# Patient Record
Sex: FEMALE | Race: WHITE | URBAN - METROPOLITAN AREA
[De-identification: names, ages, dates, MRNs, and addresses within clinical notes are randomized per-mention and may not be internally consistent; named-entity substitution may affect disease eponyms.]

---

## 2018-07-11 ENCOUNTER — TRANSFERRED RECORDS (OUTPATIENT)
Dept: HEALTH INFORMATION MANAGEMENT | Facility: CLINIC | Age: 38
End: 2018-07-11

## 2018-07-12 ENCOUNTER — TRANSFERRED RECORDS (OUTPATIENT)
Dept: HEALTH INFORMATION MANAGEMENT | Facility: CLINIC | Age: 38
End: 2018-07-12

## 2018-07-18 ENCOUNTER — MEDICAL CORRESPONDENCE (OUTPATIENT)
Dept: HEALTH INFORMATION MANAGEMENT | Facility: CLINIC | Age: 38
End: 2018-07-18

## 2018-07-20 ENCOUNTER — TRANSFERRED RECORDS (OUTPATIENT)
Dept: HEALTH INFORMATION MANAGEMENT | Facility: CLINIC | Age: 38
End: 2018-07-20

## 2018-07-20 ENCOUNTER — TELEPHONE (OUTPATIENT)
Dept: ORTHOPEDICS | Facility: CLINIC | Age: 38
End: 2018-07-20

## 2018-07-20 NOTE — TELEPHONE ENCOUNTER
[facility] Call Center    Phone Message    May a detailed message be left on voicemail: yes    Reason for Call: Other: Dr. Simmons of [facility] requesting a call back from Dr. Barrow or his team.  Dr. 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 To avoid multiple trips, Dr. Simmons would like to know if a same day biopsy can be done. She spoke with her Radiologist and they felt they are 99% sure a biopsy is needed; and should be under an ortho specialist.  She would also need to know if a biopsy is scheduled same day to stop all blood thinners beforehand.  Please call her back as soon as possible at [phone]. Thank you. Provider to provider line was declined.    Action Taken: Message routed to:  [facility] Ortho       7/15/18 -  See Dr. Barrow's note from 7/23/18.       Let message for Dr. Simmons that we had scheduled a consult and bx on the same day for Vee for next week 8/1/18.  Patient also notified.  We will cancel appt. With Dr. Barrow scheduled for tomorrow and re-schedule after we have biopsy results.

## 2018-07-23 ENCOUNTER — TELEPHONE (OUTPATIENT)
Dept: ORTHOPEDICS | Facility: CLINIC | Age: 38
End: 2018-07-23

## 2018-07-23 DIAGNOSIS — M79.89 SOFT TISSUE MASS: Primary | ICD-10-CM

## 2018-07-23 NOTE — TELEPHONE ENCOUNTER
FUTURE VISIT INFORMATION      FUTURE VISIT INFORMATION:    Date: 7/26/18    Time: 1230    Location: ORTHO  REFERRAL INFORMATION:    Referring provider:  DR OLGUIN    Referring providers clinic:  [facility]     Reason for visit/diagnosis  PELVIC MASS      RECORDS STATUS        RECORDS RECEIVED FROM: [facility]   DATE RECEIVED: 7/23/18   NOTES STATUS DETAILS   OFFICE NOTE from referring provider Received 6/20/18*   OFFICE NOTE from other specialist N/A    DISCHARGE SUMMARY from hospital N/A    DISCHARGE REPORT from the ER N/A    OPERATIVE REPORT N/A    MEDICATION LIST Received    IMPLANT RECORD/STICKER N/A    LABS     CBC/DIFF N/A    CULTURES N/A    INJECTIONS DONE IN RADIOLOGY N/A    MRI Received 7/12/18*   CT SCAN Received 7/11/18*   XRAYS (IMAGES & REPORTS) N/A    TUMOR     PATHOLOGY  Slides & report N/A

## 2018-07-23 NOTE — TELEPHONE ENCOUNTER
"I received a call from referring physician Dr. Chetna Simmons about this patient who is 38-year-old woman with newly identified 6 cm mass along the left pelvic sidewall in a retroperitoneal location.  Dr. Jara requests that we see the patient for initial evaluation and consider performing biopsy on the same day to minimize travel inconvenience for the patient.  I reviewed the films and imaging studies.  Patient has a history of pulmonary embolus and is on Xarelto.  I have recommended the followin. Arrange for outpatient consultation in the morning and biopsy by interventional radiology service in the afternoon.  2. Discontinue Xarelto today.  3. I have asked Trinh Perez to contact our to interventional radiology service about seeking their approval for the above plan.  4. The preferred biopsy route has been outlined on the MRI axial fat suppression study on PACS under presentation state = \"biopsy route\".        MD Ruby Osman Family Professor  Oncology and Adult Reconstructive Surgery  Dept Orthopaedic Surgery, [facility]  [phone] office, [phone] pager  [email or web address]    "

## 2018-07-25 ENCOUNTER — HEALTH MAINTENANCE LETTER (OUTPATIENT)
Age: 38
End: 2018-07-25

## 2018-07-26 ENCOUNTER — PRE VISIT (OUTPATIENT)
Dept: ORTHOPEDICS | Facility: CLINIC | Age: 38
End: 2018-07-26

## 2018-07-26 ASSESSMENT — ENCOUNTER SYMPTOMS
DYSPNEA ON EXERTION: 0
POSTURAL DYSPNEA: 0
SPUTUM PRODUCTION: 1
WHEEZING: 0
HEMOPTYSIS: 0
SNORES LOUDLY: 0
COUGH: 1
COUGH DISTURBING SLEEP: 1
SHORTNESS OF BREATH: 1

## 2018-07-30 ENCOUNTER — TELEPHONE (OUTPATIENT)
Dept: INTERVENTIONAL RADIOLOGY/VASCULAR | Facility: CLINIC | Age: 38
End: 2018-07-30

## 2018-08-01 ENCOUNTER — APPOINTMENT (OUTPATIENT)
Dept: INTERVENTIONAL RADIOLOGY/VASCULAR | Facility: CLINIC | Age: 38
End: 2018-08-01
Attending: ORTHOPAEDIC SURGERY
Payer: COMMERCIAL

## 2018-08-01 ENCOUNTER — OFFICE VISIT (OUTPATIENT)
Dept: INTERVENTIONAL RADIOLOGY/VASCULAR | Facility: CLINIC | Age: 38
End: 2018-08-01
Payer: COMMERCIAL

## 2018-08-01 ENCOUNTER — APPOINTMENT (OUTPATIENT)
Dept: MEDSURG UNIT | Facility: CLINIC | Age: 38
End: 2018-08-01
Attending: ORTHOPAEDIC SURGERY
Payer: COMMERCIAL

## 2018-08-01 ENCOUNTER — HOSPITAL ENCOUNTER (OUTPATIENT)
Facility: CLINIC | Age: 38
Discharge: HOME | End: 2018-08-01
Attending: ORTHOPAEDIC SURGERY | Admitting: ORTHOPAEDIC SURGERY
Payer: COMMERCIAL

## 2018-08-01 VITALS
OXYGEN SATURATION: 100 % | WEIGHT: 176.2 LBS | DIASTOLIC BLOOD PRESSURE: 74 MMHG | HEART RATE: 82 BPM | SYSTOLIC BLOOD PRESSURE: 111 MMHG

## 2018-08-01 VITALS
TEMPERATURE: 98.1 F | OXYGEN SATURATION: 99 % | DIASTOLIC BLOOD PRESSURE: 65 MMHG | SYSTOLIC BLOOD PRESSURE: 120 MMHG | HEART RATE: 64 BPM | RESPIRATION RATE: 14 BRPM

## 2018-08-01 DIAGNOSIS — R19.00 RETROPERITONEAL MASS: ICD-10-CM

## 2018-08-01 DIAGNOSIS — M79.89 SOFT TISSUE MASS: ICD-10-CM

## 2018-08-01 DIAGNOSIS — M32.8 OTHER FORMS OF SYSTEMIC LUPUS ERYTHEMATOSUS, UNSPECIFIED ORGAN INVOLVEMENT STATUS (H): ICD-10-CM

## 2018-08-01 DIAGNOSIS — R76.0 LUPUS ANTICOAGULANT POSITIVE: ICD-10-CM

## 2018-08-01 DIAGNOSIS — M32.14 LUPUS NEPHRITIS, ISN/RPS CLASS II (H): ICD-10-CM

## 2018-08-01 PROCEDURE — 88184 FLOWCYTOMETRY/ TC 1 MARKER: CPT | Performed by: ORTHOPAEDIC SURGERY

## 2018-08-01 PROCEDURE — 88364 INSITU HYBRIDIZATION (FISH): CPT | Performed by: ORTHOPAEDIC SURGERY

## 2018-08-01 PROCEDURE — 99152 MOD SED SAME PHYS/QHP 5/>YRS: CPT

## 2018-08-01 PROCEDURE — 40000166 ZZH STATISTIC PP CARE STAGE 1

## 2018-08-01 PROCEDURE — 88342 IMHCHEM/IMCYTCHM 1ST ANTB: CPT | Performed by: ORTHOPAEDIC SURGERY

## 2018-08-01 PROCEDURE — 88365 INSITU HYBRIDIZATION (FISH): CPT | Performed by: ORTHOPAEDIC SURGERY

## 2018-08-01 PROCEDURE — 40001004 ZZHCL STATISTIC FLOW INT 9-15 ABY TC 88188: Performed by: ORTHOPAEDIC SURGERY

## 2018-08-01 PROCEDURE — 99153 MOD SED SAME PHYS/QHP EA: CPT

## 2018-08-01 PROCEDURE — 27210909 ZZH NEEDLE CR5

## 2018-08-01 PROCEDURE — 88333 PATH CONSLTJ SURG CYTO XM 1: CPT | Performed by: ORTHOPAEDIC SURGERY

## 2018-08-01 PROCEDURE — 27210903 ZZH KIT CR5

## 2018-08-01 PROCEDURE — 88185 FLOWCYTOMETRY/TC ADD-ON: CPT | Performed by: ORTHOPAEDIC SURGERY

## 2018-08-01 PROCEDURE — 88305 TISSUE EXAM BY PATHOLOGIST: CPT | Performed by: ORTHOPAEDIC SURGERY

## 2018-08-01 PROCEDURE — 49180 BIOPSY ABDOMINAL MASS: CPT

## 2018-08-01 PROCEDURE — 40000141 ZZH STATISTIC PERIPHERAL IV START W/O US GUIDANCE

## 2018-08-01 PROCEDURE — 25000128 H RX IP 250 OP 636: Performed by: STUDENT IN AN ORGANIZED HEALTH CARE EDUCATION/TRAINING PROGRAM

## 2018-08-01 PROCEDURE — 25000128 H RX IP 250 OP 636: Performed by: PHYSICIAN ASSISTANT

## 2018-08-01 PROCEDURE — 25000125 ZZHC RX 250: Performed by: STUDENT IN AN ORGANIZED HEALTH CARE EDUCATION/TRAINING PROGRAM

## 2018-08-01 PROCEDURE — 88341 IMHCHEM/IMCYTCHM EA ADD ANTB: CPT | Performed by: ORTHOPAEDIC SURGERY

## 2018-08-01 RX ORDER — FLUMAZENIL 0.1 MG/ML
0.2 INJECTION, SOLUTION INTRAVENOUS
Status: DISCONTINUED | OUTPATIENT
Start: 2018-08-01 | End: 2018-08-01 | Stop reason: HOSPADM

## 2018-08-01 RX ORDER — CHLOROQUINE PHOSPHATE 250 MG/1
250 TABLET ORAL DAILY
COMMUNITY
Start: 2018-03-25

## 2018-08-01 RX ORDER — GLUCOSAMINE/D3/BOSWELLIA SERRA 1500MG-400
1 TABLET ORAL 2 TIMES DAILY
COMMUNITY
Start: 2018-03-25

## 2018-08-01 RX ORDER — MULTIVITAMIN WITH IRON
TABLET ORAL
COMMUNITY
Start: 2018-01-04

## 2018-08-01 RX ORDER — LEVONORGESTREL AND ETHINYL ESTRADIOL 0.15-0.03
KIT ORAL
COMMUNITY
Start: 2018-03-25

## 2018-08-01 RX ORDER — FENTANYL CITRATE 50 UG/ML
25-50 INJECTION, SOLUTION INTRAMUSCULAR; INTRAVENOUS EVERY 5 MIN PRN
Status: DISCONTINUED | OUTPATIENT
Start: 2018-08-01 | End: 2018-08-01 | Stop reason: HOSPADM

## 2018-08-01 RX ORDER — LANOLIN ALCOHOL/MO/W.PET/CERES
400 CREAM (GRAM) TOPICAL 2 TIMES DAILY
COMMUNITY
Start: 2018-03-25

## 2018-08-01 RX ORDER — LISINOPRIL 5 MG/1
2.5 TABLET ORAL 2 TIMES DAILY
COMMUNITY
Start: 2018-03-25

## 2018-08-01 RX ORDER — SODIUM CHLORIDE 9 MG/ML
INJECTION, SOLUTION INTRAVENOUS CONTINUOUS
Status: DISCONTINUED | OUTPATIENT
Start: 2018-08-01 | End: 2018-08-01 | Stop reason: HOSPADM

## 2018-08-01 RX ORDER — NALOXONE HYDROCHLORIDE 0.4 MG/ML
.1-.4 INJECTION, SOLUTION INTRAMUSCULAR; INTRAVENOUS; SUBCUTANEOUS
Status: DISCONTINUED | OUTPATIENT
Start: 2018-08-01 | End: 2018-08-01 | Stop reason: HOSPADM

## 2018-08-01 RX ORDER — PREDNISONE 5 MG/1
5 TABLET ORAL DAILY
COMMUNITY
Start: 2018-03-25 | End: 2018-10-26

## 2018-08-01 RX ORDER — LIDOCAINE 40 MG/G
CREAM TOPICAL
Status: DISCONTINUED | OUTPATIENT
Start: 2018-08-01 | End: 2018-08-01 | Stop reason: HOSPADM

## 2018-08-01 RX ADMIN — FENTANYL CITRATE 150 MCG: 50 INJECTION INTRAMUSCULAR; INTRAVENOUS at 14:20

## 2018-08-01 RX ADMIN — SODIUM CHLORIDE: 9 INJECTION, SOLUTION INTRAVENOUS at 11:51

## 2018-08-01 RX ADMIN — LIDOCAINE HYDROCHLORIDE 10 ML: 10 INJECTION, SOLUTION EPIDURAL; INFILTRATION; INTRACAUDAL; PERINEURAL at 14:21

## 2018-08-01 RX ADMIN — MIDAZOLAM 3 MG: 1 INJECTION INTRAMUSCULAR; INTRAVENOUS at 14:21

## 2018-08-01 NOTE — PROCEDURES
Interventional Radiology Brief Post Procedure Note    Procedure: Pelvis mass biosy    Proceduralist: Johnny Sinclair MD    Assistant: IR Fellow Physician, Joseph Ohara MD and None    Time Out: Prior to the start of the procedure and with procedural staff participation, I verbally confirmed the patient s identity using two indicators, relevant allergies, that the procedure was appropriate and matched the consent or emergent situation, and that the correct equipment/implants were available. Immediately prior to starting the procedure I conducted the Time Out with the procedural staff and re-confirmed the patient s name, procedure, and site/side. (The Joint Commission universal protocol was followed.)  Yes        Sedation: IR Nurse Monitored Care   Post Procedure Summary:  Prior to the start of the procedure and with procedural staff participation, I verbally confirmed the patient s identity using two indicators, relevant allergies, that the procedure was appropriate and matched the consent or emergent situation, and that the correct equipment/implants were available. Immediately prior to starting the procedure I conducted the Time Out with the procedural staff and re-confirmed the patient s name, procedure, and site/side. (The Joint Commission universal protocol was followed.)  Yes       Sedatives: Fentanyl and Midazolam (Versed)    Vital signs, airway and pulse oximetry were monitored and remained stable throughout the procedure and sedation was maintained until the procedure was complete.  The patient was monitored by staff until sedation discharge criteria were met.    Patient tolerance: Patient tolerated the procedure well with no immediate complications.    Time of sedation in minutes: 30 Minutes minutes from beginning to end of physician one to one monitoring.          Findings: Pelvis mass biopsy    Estimated Blood Loss: Minimal    Fluoroscopy Time:  minute(s)    SPECIMENS: Core needle biopsy specimens sent  for pathological analysis    Complications: 1. None     Condition: Stable    Plan: Bedrest for 1 hours    Comments: See dictated procedure note for full details.    Frank Ohara MD

## 2018-08-01 NOTE — PROGRESS NOTES
1515 Pt tolerating oral intake. Dc instructions reviewed, copy given to pt. Pt may resume xarelto this evening per Dr. Ohara. Pt tolerated ambulation. PIV dc'd. 1530 Pt dc'd home accompanied by mother and sister.

## 2018-08-01 NOTE — IP AVS SNAPSHOT
MRN:[id number]                      After Visit Summary   8/1/2018    Lilia Correia    MRN: [id number]           Visit Information        Department      8/1/2018 10:46 AM [address] [facility]          Review of your medicines      UNREVIEWED medicines. Ask your doctor about these medicines        Dose / Directions    acetaminophen-codeine 300-30 MG per tablet   Commonly known as:  TYLENOL #3        Dose:  30 tablet   Take 30 tablets by mouth as needed   Refills:  0       Biotin 23000 MCG Tabs        Dose:  1 tablet   Take 1 tablet by mouth 2 times daily   Refills:  0       chloroquine 250 MG tablet   Commonly known as:  ARALEN        Dose:  250 mg   Take 250 mg by mouth daily   Refills:  0       cholecalciferol 5000 units Caps capsule   Commonly known as:  vitamin D3        Dose:  5000 Units   Take 5,000 Units by mouth daily   Refills:  0       CRANBERRY (VACC OXYCOCCUS) PO        Dose:  1 capsule   Take 1 capsule by mouth daily   Refills:  0       folic acid 400 MCG tablet   Commonly known as:  FOLVITE        Dose:  400 mcg   Take 400 mcg by mouth 2 times daily   Refills:  0       IRON (FERROUS SULFATE) PO        Dose:  1 tablet   Take 1 tablet by mouth 2 times daily   Refills:  0       levonorgestrel-ethinyl estradiol 0.15-0.03 MG per tablet   Commonly known as:  SEASONALE        Refills:  0       lisinopril 5 MG tablet   Commonly known as:  PRINIVIL/ZESTRIL        Dose:  2.5 mg   2.5 mg 2 times daily   Refills:  0       magnesium 250 MG tablet        Refills:  0       methotrexate (Anti-Rheumatic) 2.5 MG Tabs        Refills:  0       OMEPRAZOLE PO        Dose:  20 mg   Take 20 mg by mouth every morning   Refills:  0       predniSONE 5 MG tablet   Commonly known as:  DELTASONE        Dose:  5 mg   Take 5 mg by mouth daily   Refills:  0       XARELTO 20 MG Tabs tablet   Generic drug:  rivaroxaban ANTICOAGULANT        Refills:  0                Protect others around you: Learn how to safely use,  store and throw away your medicines at www.disposemymeds.org.         Follow-ups after your visit         Care Instructions        Further instructions from your care team       [facility]    Interventional Radiology  Patient Instructions Following Pelvic Biopsy    AFTER YOU GO HOME  ? If you were given sedation DO NOT drive or operate machinery at home or at work for at least 24 hours  ? DO relax and take it easy for 48 hours, no strenuous activity for 24 hours  ? DO drink plenty of fluids  ? DO resume your regular diet, unless otherwise instructed by your Primary Physician  ? Keep the dressing dry and in place for 24 hours.  ? DO NOT SMOKE FOR AT LEAST 24 HOURS, if you have been given any medications that were to help you relax or sedate you during your procedure  ? DO NOT drink alcoholic beverages the day of your procedure  ? DO NOT do any strenuous exercise or lifting (> 10 lbs) for at least 3 days following your procedure  ? DO NOT take a bath or shower for at least 12 hours following your procedure  ? Remove dressing after shower the next day. Replace with Band aid for 2 days.  Never leave a wet dressing in place.  ? DO NOT make any important or legal decisions for 24 hours following your procedure  ? There should be minimum drainage from the biopsy site    CALL THE PHYSICIAN IF:  ? You start bleeding from the procedure site.  If you do start to bleed from that site,  hold pressure on the site for a minimum of 10 minutes.  Your physician will tell you if you need to return to the hospital  ? You develop nausea or vomiting  ? You have excessive swelling, redness, or tenderness at the site  ? You have drainage that looks like it is infected.  ? You experience severe pain  ? You develop hives or a rash or unexplained itching  ? You develop a temperature of 101 degrees F or greater    ADDITIONAL INSTRUCTIONS: Restart Xarelto this evening    [facility] INTERVENTIONAL RADIOLOGY DEPARTMENT  Procedure  Physician:         Dr. Sinclair/Dr. Ohara  Date of procedure: August 1, 2018  Telephone Numbers: [phone] Monday-Friday 8:00 am to 4:30 pm  [phone] After 4:30 pm Monday-Friday, Weekends & Holidays.   Ask for the Interventional Radiologist on call.  Someone is on call 24 hrs/day  [facility] toll free number: [phone] Monday-Friday 8:00 am to 4:30 pm  [facility] Emergency Dept: [phone]           Additional Information About Your Visit        MyChart Information     [facility] gives you secure access to your electronic health record. If you see a primary care provider, you can also send messages to your care team and make appointments. If you have questions, please call your primary care clinic.  If you do not have a primary care provider, please call [phone] and they will assist you.        Care EveryWhere ID     This is your Care EveryWhere ID. This could be used by other organizations to access your [facility] medical records  [id number]        Your Vitals Were     Blood Pressure Pulse Temperature Respirations Pulse Oximetry       115/70 60 98.1  F (36.7  C) (Oral) 14 98%        Primary Care Provider Office Phone # Fax #    Anika Simmons [phone]      Equal Access to Services     ANDRES CUNNINGHAM : Hadii elizabeth ku hadasho Soomaali, waaxda luqadaha, qaybta kaalmada adeegyada, waxay ana hayflory guillermo . So [facility] [phone].    ATENCIÓN: Si habla español, tiene a medeiros disposición servicios gratuitos de asistencia lingüística. Llame al [phone].    We comply with applicable federal civil rights laws and [place] laws. We do not discriminate on the basis of race, color, national origin, age, disability, sex, sexual orientation, or gender identity.            Thank you!     Thank you for choosing [facility] for your care. Our goal is always to provide you with excellent care. Hearing back from our patients is one way we can continue to improve our services. Please take a few  minutes to complete the written survey that you may receive in the mail after you visit with us. Thank you!             Medication List: This is a list of all your medications and when to take them. Check marks below indicate your daily home schedule. Keep this list as a reference.      Medications           Morning Afternoon Evening Bedtime As Needed    acetaminophen-codeine 300-30 MG per tablet   Commonly known as:  TYLENOL #3   Take 30 tablets by mouth as needed                                Biotin 97897 MCG Tabs   Take 1 tablet by mouth 2 times daily                                chloroquine 250 MG tablet   Commonly known as:  ARALEN   Take 250 mg by mouth daily                                cholecalciferol 5000 units Caps capsule   Commonly known as:  vitamin D3   Take 5,000 Units by mouth daily                                CRANBERRY (VACC OXYCOCCUS) PO   Take 1 capsule by mouth daily                                folic acid 400 MCG tablet   Commonly known as:  FOLVITE   Take 400 mcg by mouth 2 times daily                                IRON (FERROUS SULFATE) PO   Take 1 tablet by mouth 2 times daily                                levonorgestrel-ethinyl estradiol 0.15-0.03 MG per tablet   Commonly known as:  SEASONALE                                lisinopril 5 MG tablet   Commonly known as:  PRINIVIL/ZESTRIL   2.5 mg 2 times daily                                magnesium 250 MG tablet                                methotrexate (Anti-Rheumatic) 2.5 MG Tabs                                OMEPRAZOLE PO   Take 20 mg by mouth every morning                                predniSONE 5 MG tablet   Commonly known as:  DELTASONE   Take 5 mg by mouth daily                                XARELTO 20 MG Tabs tablet   Generic drug:  rivaroxaban ANTICOAGULANT

## 2018-08-01 NOTE — IP AVS SNAPSHOT
[address] [address]                                       After Visit Summary   8/1/2018    Lilia Correia    MRN: [id number]           After Visit Summary Signature Page     I have received my discharge instructions, and my questions have been answered. I have discussed any challenges I see with this plan with the nurse or doctor.    ..........................................................................................................................................  Patient/Patient Representative Signature      ..........................................................................................................................................  Patient Representative Print Name and Relationship to Patient    ..................................................               ................................................  Date                                            Time    ..........................................................................................................................................  Reviewed by Signature/Title    ...................................................              ..............................................  Date                                                            Time

## 2018-08-01 NOTE — PROGRESS NOTES
Patient Name: Lilia Correia  Medical Record Number: [id number]  Today's Date: 8/1/2018    Procedure: Pelvic Mass Biopsy   Proceduralist: Dr. Ohara, TO with Dr. Sinclair    Sedation start time: 1340  Sedation end time: 1415  Sedation medications administered: Fentanyl 150 mcg, Versed 3 mg   Total sedation time: 45 min    Procedure start time: 1350  Puncture time: 1351  Procedure end time: 1420    Report given to: Janet SERVIN RN  : N/A    Other Notes: Pt arrived to IR room CT2 from . Pt denies any questions or concerns regarding procedure. Pt positioned supine and monitored per protocol. Pt tolerated procedure without any noted complications.  Dressing CDI.  5 cores obtained, pathology present and responsible for specimen.  Pt transferred back to .    Bria Daniel RN

## 2018-08-01 NOTE — PROGRESS NOTES
Pt back from IR s/p pelvic mass biopsy.  VSS.  Pt alert and oriented x4.  Pt denies any pain.  LLQ site F/D/I.  Pt's family at the bedside.

## 2018-08-01 NOTE — PROGRESS NOTES
First Name: Lilia   Age: 38 year old   Referring Physician: Dr. Barrow   REASON FOR REFERRAL: Consult for left retroperitoneal mass biopsy    Assessment:  Lilai has a very complex history with her SLE as described in the HPI.  She is on Xarelto due to a DVT in 2014 and + lupus anticoagulant test.  Her family feels that she is reno to still be with them because she was so ill in 2014.  She was being worked up for GERD recently and was incidentally found to have a 6 cm left pelvic sidewall mass in a retroperitoneal location.  Biopsy is requested to evaluate for malignancy.    Plan:  Image guided biopsy of left retroperitoneal mass  She has held the Xarelto since Monday.  INR's are not accurate when you are taking this medicaiton  She does not need a pregnancy test, not currently sexually active  Patient has not eaten today    HPI: This is a patient systemic lupus erythematosus. 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 Positive serologies have included a positive SARAH, U1 RNP antibody, double-stranded DNA antibody, rheumatoid factor, CCP antibody, polyclonal hypergammaglobulinemia and hypocomplementemia. She has a history of left leg DVT in 2014 associated with a positive lupus anticoagulant and she is on chronic anticoagulation. She was diagnosed with lupus in 2003. She was initially treated with hydroxychloroquine, but she did not tolerate it due to hair loss. Subsequently she was diagnosed with biopsy-proven lupus nephritis in 2006. She was started on CellCept in April 2006. She did well until June 2013 when she developed right-sided facial paresthesia and she had an MRI of the brain that showed suspicious lesions in the brainstem and thalamus. 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 Her symptoms and brain lesions resolved after she  stopped the CellCept. 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 In September 2013 she developed nonischemic cardiomyopathy felt to likely be viral, which has since resolved. She also had cytomegalovirus (CMV) pneumonitis. Hydroxychloroquine was resumed in 2013, but it again was not tolerated due to hair loss. We switched to chloroquine in January 2014. She is currently on 250 mg daily of chloroquine. She is on low-dose prednisone 5 mg daily.    The patient was in the ER (emergency room) in August 2017 with joint and muscle pain. She was given a prednisone taper. She contacted Rheumatology again in October with polyarticular joint symptoms and they used another taper. That taper ended up having to be extended for a couple of weeks. She saw Dr Matt in November. Her nephritis was quiescent. Her labs were satisfactory at that time. Shortly after that, however, she required yet another prednisone burst for joint pain and swelling. She talked to rheumatology via the phone at that time regarding potentially starting methotrexate or azathioprine. She declined. She was started on 5 mg daily of prednisone at baseline. Despite the low-dose prednisone, she contacted rheumatology again in January 2018 with polyarticular joint symptoms and she used another prednisone taper. The patient noted in early May she developed another episode of hand swelling across her MCP joints. She gets several of these episodes. They last about 24 hours and then resolve. Otherwise, she feels like she has been doing well in terms of her joint symptoms currently. She is concerned about her frequent flares. She does get low-grade temperature elevations with her flares. She started methotrexate in late May, which is weekly.      She has issues with GERD and was referred to GI.  As part of the work-up she had a CT scan of the abdomen and pelvis.  This showed a 6 cm mass along the left pelvic sidewall in a retroperitoneal location.   She has no pain in her left lower quadrant.  A Dr. Chetna Simmons refered the patient to Dr. Barrow.  Dr. Barrow reviewed the patient's chart and imaging and he has placed an image in the chart for the angle he would prefer IR to take for the biopsy.  IR has reviewed the case and will have to take a slightly different angle to avoid the vessels.  The patient is from 3 hours away and so is doing all of her appointments in one day.    Lilia is highly anxious.  Her sister and her mother, help her remember her history, I can tell this frustrates Lilia, but she tolerates it.  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  She told me that she really enjoys her job.  She is worried though because she read the CT report and the identified very small lymph nodes that could be concerning for metastatic disease.  I explained that they report everything they see and have to state all of the possibilities.  However, the lymph nodes are too small at this time to even biopsy or know what to make of, so she should not worry about them at this time.  She should just focus on the biopsy and getting a diagnosis.    PAST MEDICAL HISTORY:   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     GERD (gastroesophageal reflux disease)      Hypertension      Left leg DVT (H) 2014     Non-ischemic cardiomyopathy (H)     likely viral (EBV vs CMV) now resolved     Pneumocystis jiroveci pneumonia (H) 08/2013     PAST SURGICAL HISTORY:   Past Surgical History:   Procedure Laterality Date     ENDOSCOPY  07/24/2018     US BIOPSY RENAL  2006     FAMILY HISTORY:   Family History   Problem Relation Age of Onset     Depression Mother      Hypertension Father      Ovarian Cancer Maternal Grandmother 75     HEART DISEASE Maternal Grandfather 54      Dementia Paternal Grandmother 80     Other - See Comments Paternal Grandfather 92     old age     SOCIAL HISTORY:   Social History   Substance Use Topics     Smoking status: Current Every Day Smoker     Packs/day: 0.50     Years: 20.00     Types: Cigarettes     Start date: 1/1/1998     Smokeless tobacco: Never Used     Alcohol use No     PROBLEM LIST:   Patient Active Problem List    Diagnosis Date Noted     Retroperitoneal mass 08/01/2018     Priority: Medium     Other forms of systemic lupus erythematosus (H) 08/01/2018     Priority: Medium     Lupus nephritis, ISN/RPS class II (H) 08/01/2018     Priority: Medium     Lupus anticoagulant positive 08/01/2018     Priority: Medium     MEDICATIONS:   Prescription Medications as of 8/1/2018             acetaminophen-codeine (TYLENOL #3) 300-30 MG per tablet Take 30 tablets by mouth as needed    Biotin 80663 MCG TABS Take 1 tablet by mouth daily    chloroquine (ARALEN) 250 MG tablet Take 250 mg by mouth daily    cholecalciferol (VITAMIN D3) 5000 units CAPS capsule Take 5,000 Units by mouth daily    CRANBERRY, VACC OXYCOCCUS, PO     folic acid (FOLVITE) 400 MCG tablet     IRON, FERROUS SULFATE, PO     levonorgestrel-ethinyl estradiol (SEASONALE) 0.15-0.03 MG per tablet     lisinopril (PRINIVIL/ZESTRIL) 5 MG tablet     magnesium 250 MG tablet     methotrexate, Anti-Rheumatic, 2.5 MG TABS     predniSONE (DELTASONE) 5 MG tablet     rivaroxaban ANTICOAGULANT (XARELTO) 20 MG TABS tablet       Facility Administered Medications as of 8/1/2018             HOLD: rivaroxaban (XARELTO) pre-procedure High Bleeding Risk [For liver biopsy, lung biopsy, bone biopsy, pancreas biopsy, kidney biopsy and breast biopsy (Vacuum assisted)] HOLD    lidocaine (LMX4) cream Apply topically every hour as needed for pain (with VAD insertion or accessing implanted port.)    lidocaine 1 % 1 mL 1 mL by Other route every hour as needed (mild pain with VAD insertion or accessing implanted port)    sodium  chloride (PF) 0.9% PF flush 3 mL 3 mLs by Intracatheter route every hour as needed for line flush    sodium chloride (PF) 0.9% PF flush 3 mL 3 mLs by Intracatheter route every 8 hours    sodium chloride 0.9% infusion Inject into the vein continuous        ALLERGIES: Amoxicillin-pot clavulanate; Sulfa drugs; Tape  [adhesive tape]; and Sulfamethoxazole-trimethoprim  VITALS: /74  Pulse 82  Wt 79.9 kg (176 lb 3.2 oz)  SpO2 100%    ROS:  Answers for HPI/ROS submitted by the patient on 7/26/2018   General Symptoms: No  Skin Symptoms: No  HENT Symptoms: No  EYE SYMPTOMS: No  HEART SYMPTOMS: No  LUNG SYMPTOMS: Yes  INTESTINAL SYMPTOMS: No  URINARY SYMPTOMS: No  GYNECOLOGIC SYMPTOMS: No  BREAST SYMPTOMS: No  SKELETAL SYMPTOMS: No  BLOOD SYMPTOMS: No  NERVOUS SYSTEM SYMPTOMS: No  MENTAL HEALTH SYMPTOMS: No  Cough: Yes  Sputum or phlegm: Yes  Coughing up blood: No  Difficulty breating or shortness of breath: Yes  Snoring: No  Wheezing: No  Difficulty breathing on exertion: No  Nighttime Cough: Yes  Difficulty breathing when lying flat: No  PHQ-2 Score: 0    Physical Examination: Vital signs are reviewed and they are stable  Constitutional: Pleasant, middle aged woman, in no acute physical distress, came with her family to the appt  Cardiovascular: negative  Respiratory: negative  Musculoskeletal: extremities normal- no gross deformities noted, gait normal and normal muscle tone  Skin: no suspicious lesions or rashes  Neurologic: negative  Psychiatric: anxious and mentation appears normal.    BMP RESULTS:  No results found for: NA, POTASSIUM, CHLORIDE, CO2, ANIONGAP, GLC, BUN, CR, GFRESTIMATED, GFRESTBLACK, CARLOS     CBC RESULTS:  No results found for: WBC, RBC, HGB, HCT, MCV, MCH, MCHC, RDW, PLT    INR/PTT:  No results found for: INR, PTT    Diagnostic studies: see outside CT and MRi    PROVIDER NOTE:  I explained the procedure to Vee, her mother and her sister.  This included:  Preparing for the procedure, the  actual procedure and recovery.  I explained the risks of the biopsy:  Bleeding, infection, hitting an unintended organ (vessel or nerve)  I explained that usually the results return after two to four business days.    I explained that he/she would be contacted by 's office following this to determine a future plan.  Thank you for involving us in the care of your patient.    30 minutes was spent with Lilia and her family.  25 minutes was spent in counseling.    Mag Lopes MS, APRN, CNS, CRN  Clinical Nurse Specialist  Interventional Radiology  [phone] (voice mail)  [phone] (pager)    CC  Patient Care Team:  Anika Simmons MD as PCP - General (Family Practice)  CHECO THOMAS

## 2018-08-01 NOTE — PROGRESS NOTES
1225 Pt on 2a prepped and ready for biopsy. PIV placed. HCG and INR not needed per Mag Moser and Dr. Sinclair. CBC results in care everywhere. H&P current. Family at bedside. Pt consented.

## 2018-08-01 NOTE — DISCHARGE INSTRUCTIONS
[facility]    Interventional Radiology  Patient Instructions Following Pelvic Biopsy    AFTER YOU GO HOME  ? If you were given sedation DO NOT drive or operate machinery at home or at work for at least 24 hours  ? DO relax and take it easy for 48 hours, no strenuous activity for 24 hours  ? DO drink plenty of fluids  ? DO resume your regular diet, unless otherwise instructed by your Primary Physician  ? Keep the dressing dry and in place for 24 hours.  ? DO NOT SMOKE FOR AT LEAST 24 HOURS, if you have been given any medications that were to help you relax or sedate you during your procedure  ? DO NOT drink alcoholic beverages the day of your procedure  ? DO NOT do any strenuous exercise or lifting (> 10 lbs) for at least 3 days following your procedure  ? DO NOT take a bath or shower for at least 12 hours following your procedure  ? Remove dressing after shower the next day. Replace with Band aid for 2 days.  Never leave a wet dressing in place.  ? DO NOT make any important or legal decisions for 24 hours following your procedure  ? There should be minimum drainage from the biopsy site    CALL THE PHYSICIAN IF:  ? You start bleeding from the procedure site.  If you do start to bleed from that site,  hold pressure on the site for a minimum of 10 minutes.  Your physician will tell you if you need to return to the hospital  ? You develop nausea or vomiting  ? You have excessive swelling, redness, or tenderness at the site  ? You have drainage that looks like it is infected.  ? You experience severe pain  ? You develop hives or a rash or unexplained itching  ? You develop a temperature of 101 degrees F or greater    ADDITIONAL INSTRUCTIONS: Restart Xarelto this evening    [facility] INTERVENTIONAL RADIOLOGY DEPARTMENT  Procedure Physician:         Dr. Sinclair/Dr. Ohara  Date of procedure: August 1, 2018  Telephone Numbers: [phone] Monday-Friday 8:00 am to 4:30 pm  [phone] After 4:30 pm  Monday-Friday, Weekends & Holidays.   Ask for the Interventional Radiologist on call.  Someone is on call 24 hrs/day  [facility] toll free number: [phone] Monday-Friday 8:00 am to 4:30 pm  [facility] Emergency Dept: [phone]

## 2018-08-01 NOTE — PROCEDURES
Interventional Radiology Pre-Procedure Sedation Assessment   Time of Assessment: 12:17 PM    Expected Level: Moderate Sedation    Indication: Sedation is required for the following type of Procedure: Biopsy    Sedation and procedural consent: Risks, benefits and alternatives were discussed with Patient    PO Intake: Appropriately NPO for procedure    ASA Class: Class 2 - MILD SYSTEMIC DISEASE, NO ACUTE PROBLEMS, NO FUNCTIONAL LIMITATIONS.    Mallampati: Grade 1:  Soft palate, uvula, tonsillar pillars, and posterior pharyngeal wall visible    Lungs: Lungs Clear with good breath sounds bilaterally    Heart: Normal heart sounds and rate    History and physical reviewed and no updates needed. I have reviewed the lab findings, diagnostic data, medications, and the plan for sedation. I have determined this patient to be an appropriate candidate for the planned sedation and procedure and have reassessed the patient IMMEDIATELY PRIOR to sedation and procedure.    Jameel Claudio DO

## 2018-08-01 NOTE — MR AVS SNAPSHOT
After Visit Summary   8/1/2018    Lilia Correia    MRN: [id number]           Patient Information     Date Of Birth          1980        Visit Information        Provider Department      8/1/2018 9:00 AM Mag Lopes, PIERRE [facility] Interventional Radiology        Today's Diagnoses     Retroperitoneal mass        Other forms of systemic lupus erythematosus, unspecified organ involvement status (H)        Lupus nephritis, ISN/RPS class II (H)        Lupus anticoagulant positive           Follow-ups after your visit        Your next 10 appointments already scheduled     Aug 01, 2018 12:30 PM CDT   CT ABDOMEN RETROPERITONEAL BIOPSY with UUCT2   [facility] [place], Interventional Radiology ([facility], [facility])    [address] [address] [phone]           Please bring any scans or X-rays taken at other hospitals, if they may be helpful. Also bring a list of your medicines, including vitamins, minerals and over-the-counter drugs.  Tell your doctor in advance:   If you have any allergies.   If you are breastfeeding or there s any chance you are pregnant.   If you are taking Coumadin (or any other blood thinners) 5 days prior to the exam for any special instructions.   If you are diabetic to determine if your insulin needs have to be adjusted for the exam.  The day before your exam:   Drink extra fluids  at least six 8-ounce glasses (unless your doctor tells you to restrict fluids).  The day of your exam:   No eating or drinking for 4 hours before your test. You may take medicine with small sips of water.   Plan for an adult to drive you home and stay with you until morning.   Leave your valuables at home.  If you have any questions, please call the imaging department where you will have your exam.              Who to contact     Please call your clinic at [phone] to:    Ask questions about your health    Make or cancel  appointments    Discuss your medicines    Learn about your test results    Speak to your doctor            Additional Information About Your Visit        [employer] Information     [employer] gives you secure access to your electronic health record. If you see a primary care provider, you can also send messages to your care team and make appointments. If you have questions, please call your primary care clinic.  If you do not have a primary care provider, please call [phone] and they will assist you.      [employer] is an electronic gateway that provides easy, online access to your medical records. With [employer], you can request a clinic appointment, read your test results, renew a prescription or communicate with your care team.     To access your existing account, please contact your [facility] or call [phone] for assistance.        Care EveryWhere ID     This is your Care EveryWhere ID. This could be used by other organizations to access your [place] medical records  [id number]        Your Vitals Were     Pulse Pulse Oximetry                82 100%           Blood Pressure from Last 3 Encounters:   08/01/18 109/70   08/01/18 111/74    Weight from Last 3 Encounters:   08/01/18 79.9 kg (176 lb 3.2 oz)              Today, you had the following     No orders found for display      Information about OPIOIDS     PRESCRIPTION OPIOIDS: WHAT YOU NEED TO KNOW   We gave you an opioid (narcotic) pain medicine. It is important to manage your pain, but opioids are not always the best choice. You should first try all the other options your care team gave you. Take this medicine for as short a time (and as few doses) as possible.     These medicines have risks:    DO NOT drive when on new or higher doses of pain medicine. These medicines can affect your alertness and reaction times, and you could be arrested for driving under the influence (DUI). If you need to use opioids long-term, talk to  your care team about driving.    DO NOT operate heave machinery    DO NOT do any other dangerous activities while taking these medicines.     DO NOT drink any alcohol while taking these medicines.      If the opioid prescribed includes acetaminophen, DO NOT take with any other medicines that contain acetaminophen. Read all labels carefully. Look for the word  acetaminophen  or  Tylenol.  Ask your pharmacist if you have questions or are unsure.    You can get addicted to pain medicines, especially if you have a history of addiction (chemical, alcohol or substance dependence). Talk to your care team about ways to reduce this risk.    Store your pills in a secure place, locked if possible. We will not replace any lost or stolen medicine. If you don t finish your medicine, please throw away (dispose) as directed by your pharmacist. The [place] Pollution Control Agency has more information about safe disposal: https://www.pca.[facility].[place]/living-green/managing-unwanted-medications.     All opioids tend to cause constipation. Drink plenty of water and eat foods that have a lot of fiber, such as fruits, vegetables, prune juice, apple juice and high-fiber cereal. Take a laxative (Miralax, milk of magnesia, Colace, Senna) if you don t move your bowels at least every other day.          Primary Care Provider Office Phone # Fax #    Anika Simmons [phone]       [address] [address]        Equal Access to Services     ANDRES CUNNINGHAM : Hadii aad ku hadasho Soomaali, waaxda luqadaha, qaybta kaalmada adeopalyada, brandon robison. So [facility] [phone].    ATENCIÓN: Si habla español, tiene a medeiros disposición servicios gratuitos de asistencia lingüística. Lldeng al [phone].    We comply with applicable federal civil rights laws and [place] laws. We do not discriminate on the basis of race, color, national origin, age, disability, sex, sexual orientation,  or gender identity.            Thank you!     Thank you for choosing [facility] INTERVENTIONAL RADIOLOGY  for your care. Our goal is always to provide you with excellent care. Hearing back from our patients is one way we can continue to improve our services. Please take a few minutes to complete the written survey that you may receive in the mail after your visit with us. Thank you!             Your Updated Medication List - Protect others around you: Learn how to safely use, store and throw away your medicines at www.disposemymeds.org.          This list is accurate as of 8/1/18 10:46 AM.  Always use your most recent med list.                   Brand Name Dispense Instructions for use Diagnosis    acetaminophen-codeine 300-30 MG per tablet    TYLENOL #3     Take 30 tablets by mouth as needed        Biotin 79550 MCG Tabs      Take 1 tablet by mouth daily        chloroquine 250 MG tablet    ARALEN     Take 250 mg by mouth daily        cholecalciferol 5000 units Caps capsule    vitamin D3     Take 5,000 Units by mouth daily        CRANBERRY (VACC OXYCOCCUS) PO           folic acid 400 MCG tablet    FOLVITE          IRON (FERROUS SULFATE) PO           levonorgestrel-ethinyl estradiol 0.15-0.03 MG per tablet    SEASONALE          lisinopril 5 MG tablet    PRINIVIL/ZESTRIL          magnesium 250 MG tablet           methotrexate (Anti-Rheumatic) 2.5 MG Tabs           predniSONE 5 MG tablet    DELTASONE          XARELTO 20 MG Tabs tablet   Generic drug:  rivaroxaban ANTICOAGULANT

## 2018-08-01 NOTE — LETTER
8/1/2018       RE: Lilia Correia  [address] [facility] [address]     Dear Colleague,    Thank you for referring your patient, Lilia Correia, to the [facility] INTERVENTIONAL RADIOLOGY at [facility]. Please see a copy of my visit note below.    First Name: Lilia   Age: 38 year old   Referring Physician: Dr. Barrow   REASON FOR REFERRAL: Consult for left retroperitoneal mass biopsy    Assessment:  Lilia has a very complex history with her SLE as described in the HPI.  She is on Xarelto due to a DVT in 2014 and + lupus anticoagulant test.  Her family feels that she is reno to still be with them because she was so ill in 2014.  She was being worked up for GERD recently and was incidentally found to have a 6 cm left pelvic sidewall mass in a retroperitoneal location.  Biopsy is requested to evaluate for malignancy.    Plan:  Image guided biopsy of left retroperitoneal mass  She has held the Xarelto since Monday.  INR's are not accurate when you are taking this medicaiton  She does not need a pregnancy test, not currently sexually active  Patient has not eaten today    HPI: This is a patient systemic lupus erythematosus. 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 Positive serologies have included a positive SARAH, U1 RNP antibody, double-stranded DNA antibody, rheumatoid factor, CCP antibody, polyclonal hypergammaglobulinemia and hypocomplementemia. She has a history of left leg DVT in 2014 associated with a positive lupus anticoagulant and she is on chronic anticoagulation. She was diagnosed with lupus in 2003. She was initially treated with hydroxychloroquine, but she did not tolerate it due to hair loss. Subsequently she was diagnosed with biopsy-proven lupus nephritis in 2006. She was started on CellCept in April 2006. She did well until June 2013 when she developed  right-sided facial paresthesia and she had an MRI of the brain that showed suspicious lesions in the brainstem and thalamus. 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 Her symptoms and brain lesions resolved after she stopped the CellCept. 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 In September 2013 she developed nonischemic cardiomyopathy felt to likely be viral, which has since resolved. She also had cytomegalovirus (CMV) pneumonitis. Hydroxychloroquine was resumed in 2013, but it again was not tolerated due to hair loss. We switched to chloroquine in January 2014. She is currently on 250 mg daily of chloroquine. She is on low-dose prednisone 5 mg daily.    The patient was in the ER (emergency room) in August 2017 with joint and muscle pain. She was given a prednisone taper. She contacted Rheumatology again in October with polyarticular joint symptoms and they used another taper. That taper ended up having to be extended for a couple of weeks. She saw Dr Matt in November. Her nephritis was quiescent. Her labs were satisfactory at that time. Shortly after that, however, she required yet another prednisone burst for joint pain and swelling. She talked to rheumatology via the phone at that time regarding potentially starting methotrexate or azathioprine. She declined. She was started on 5 mg daily of prednisone at baseline. Despite the low-dose prednisone, she contacted rheumatology again in January 2018 with polyarticular joint symptoms and she used another prednisone taper. The patient noted in early May she developed another episode of hand swelling across her MCP joints. She gets several of these episodes. They last about 24 hours and then resolve. Otherwise, she feels like she has been doing well in terms of her joint symptoms currently. She is concerned about her frequent flares. She does get low-grade temperature  elevations with her flares. She started methotrexate in late May, which is weekly.      She has issues with GERD and was referred to GI.  As part of the work-up she had a CT scan of the abdomen and pelvis.  This showed a 6 cm mass along the left pelvic sidewall in a retroperitoneal location.  She has no pain in her left lower quadrant.  A Dr. Chetna Simmons  refered the patient to Dr. Barrow.  Dr. Barrow reviewed the patient's chart and imaging and he has placed an image in the chart for the angle he would prefer IR to take for the biopsy.  IR has reviewed the case and will have to take a slightly different angle to avoid the vessels.  The patient is from 3 hours away and so is doing all of her appointments in one day.    Lilia is highly anxious.  Her sister and her mother, help her remember her history, I can tell this frustrates Lilia, but she tolerates it.  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  She told me that she really enjoys her job.  She is worried though because she read the CT report and the identified very small lymph nodes that could be concerning for metastatic disease.  I explained that they report everything they see and have to state all of the possibilities.  However, the lymph nodes are too small at this time to even biopsy or know what to make of, so she should not worry about them at this time.  She should just focus on the biopsy and getting a diagnosis.    PAST MEDICAL HISTORY:   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     GERD (gastroesophageal reflux disease)      Hypertension      Left leg DVT (H) 2014     Non-ischemic cardiomyopathy (H)     likely viral (EBV vs CMV) now resolved     Pneumocystis jiroveci pneumonia (H) 08/2013     PAST SURGICAL HISTORY:   Past Surgical  History:   Procedure Laterality Date     ENDOSCOPY  07/24/2018     US BIOPSY RENAL  2006     FAMILY HISTORY:   Family History   Problem Relation Age of Onset     Depression Mother      Hypertension Father      Ovarian Cancer Maternal Grandmother 75     HEART DISEASE Maternal Grandfather 54     Dementia Paternal Grandmother 80     Other - See Comments Paternal Grandfather 92     old age     SOCIAL HISTORY:   Social History   Substance Use Topics     Smoking status: Current Every Day Smoker     Packs/day: 0.50     Years: 20.00     Types: Cigarettes     Start date: 1/1/1998     Smokeless tobacco: Never Used     Alcohol use No     PROBLEM LIST:   Patient Active Problem List    Diagnosis Date Noted     Retroperitoneal mass 08/01/2018     Priority: Medium     Other forms of systemic lupus erythematosus (H) 08/01/2018     Priority: Medium     Lupus nephritis, ISN/RPS class II (H) 08/01/2018     Priority: Medium     Lupus anticoagulant positive 08/01/2018     Priority: Medium     MEDICATIONS:   Prescription Medications as of 8/1/2018             acetaminophen-codeine (TYLENOL #3) 300-30 MG per tablet Take 30 tablets by mouth as needed    Biotin 73917 MCG TABS Take 1 tablet by mouth daily    chloroquine (ARALEN) 250 MG tablet Take 250 mg by mouth daily    cholecalciferol (VITAMIN D3) 5000 units CAPS capsule Take 5,000 Units by mouth daily    CRANBERRY, VACC OXYCOCCUS, PO     folic acid (FOLVITE) 400 MCG tablet     IRON, FERROUS SULFATE, PO     levonorgestrel-ethinyl estradiol (SEASONALE) 0.15-0.03 MG per tablet     lisinopril (PRINIVIL/ZESTRIL) 5 MG tablet     magnesium 250 MG tablet     methotrexate, Anti-Rheumatic, 2.5 MG TABS     predniSONE (DELTASONE) 5 MG tablet     rivaroxaban ANTICOAGULANT (XARELTO) 20 MG TABS tablet       Facility Administered Medications as of 8/1/2018             HOLD: rivaroxaban (XARELTO) pre-procedure High Bleeding Risk [For liver biopsy, lung biopsy, bone biopsy, pancreas biopsy, kidney biopsy  and breast biopsy (Vacuum assisted)] HOLD    lidocaine (LMX4) cream Apply topically every hour as needed for pain (with VAD insertion or accessing implanted port.)    lidocaine 1 % 1 mL 1 mL by Other route every hour as needed (mild pain with VAD insertion or accessing implanted port)    sodium chloride (PF) 0.9% PF flush 3 mL 3 mLs by Intracatheter route every hour as needed for line flush    sodium chloride (PF) 0.9% PF flush 3 mL 3 mLs by Intracatheter route every 8 hours    sodium chloride 0.9% infusion Inject into the vein continuous        ALLERGIES: Amoxicillin-pot clavulanate; Sulfa drugs; Tape  [adhesive tape]; and Sulfamethoxazole-trimethoprim  VITALS: /74  Pulse 82  Wt 79.9 kg (176 lb 3.2 oz)  SpO2 100%    ROS:    Physical Examination: Vital signs are reviewed and they are stable  Constitutional: Pleasant, middle aged woman, in no acute physical distress, came with her family to the appt  Cardiovascular: negative  Respiratory: negative  Musculoskeletal: extremities normal- no gross deformities noted, gait normal and normal muscle tone  Skin: no suspicious lesions or rashes  Neurologic: negative  Psychiatric: anxious and mentation appears normal.    BMP RESULTS:  No results found for: NA, POTASSIUM, CHLORIDE, CO2, ANIONGAP, GLC, BUN, CR, GFRESTIMATED, GFRESTBLACK, CARLOS     CBC RESULTS:  No results found for: WBC, RBC, HGB, HCT, MCV, MCH, MCHC, RDW, PLT    INR/PTT:  No results found for: INR, PTT    Diagnostic studies: see outside CT and MRi    PROVIDER NOTE:  I explained the procedure to Vee, her mother and her sister.  This included:  Preparing for the procedure, the actual procedure and recovery.  I explained the risks of the biopsy:  Bleeding, infection, hitting an unintended organ (vessel or nerve)  I explained that usually the results return after two to four business days.    I explained that he/she would be contacted by 's office following this to determine a future plan.  Thank  you for involving us in the care of your patient.    30 minutes was spent with Lilia and her family.  25 minutes was spent in counseling.    Mag Lopes MS, APRN, CNS, CRN  Clinical Nurse Specialist  Interventional Radiology  [phone] (voice mail)  [phone] (pager)    CC  Patient Care Team:  Anika Simmons MD as PCP - General (Family Practice)  CHECO THOMAS

## 2018-08-02 LAB — COPATH REPORT: NORMAL

## 2018-08-08 ENCOUNTER — TELEPHONE (OUTPATIENT)
Dept: ORTHOPEDICS | Facility: CLINIC | Age: 38
End: 2018-08-08

## 2018-08-08 NOTE — TELEPHONE ENCOUNTER
Talked to Lilia about the results of her recent biopsy.  Path not final yet.  I told her we would discuss her case in our tumor conference tomorrow morning, and I would be able to call her back about the final pathology and plan.

## 2018-08-09 ENCOUNTER — TELEPHONE (OUTPATIENT)
Dept: ORTHOPEDICS | Facility: CLINIC | Age: 38
End: 2018-08-09

## 2018-08-09 NOTE — TELEPHONE ENCOUNTER
This patient had images forwarded to me for review and I recommended that a core needle biopsy be performed by our interventional radiology team.  This was completed and the limited sampling received does not show evidence of a neoplasm or lymphoma.  I spoke with Dr. Dejon Delcid of our hematopathology section and he explained that an atypical lymphoplasmacytic infiltrate is visualized that may or may not be driven by Analy-Barr virus.  However, it would be somewhat unusual in the setting of a large discrete 5 cm mass.  Therefore, while lymphoma is not identified on the core needle biopsy sample, it cannot be ruled out with this limited sample.    Consultation with Dr. Wisam Smiley was advised in regards to management of this retroperitoneal pelvic mass and whether or not there is a need for a larger tissue sample to rule out lymphoma or if other markers, additional immunosuppression and/or observation is appropriate.   If a larger tissue sample for pathology is requested, this would involve an open surgical procedure through an iliac crest incision with subperiosteal dissection along the iliac bone to access the mass itself.      I will communicate this information to Dr. Chetna Jara in  [facility] and arrange for consultation with Dr. Smiley.    MD Ruby Osman Family Professor  Oncology and Adult Reconstructive Surgery  Dept Orthopaedic Surgery, [facility]  [phone] office, [phone] pager  [email or web address]

## 2018-08-22 ENCOUNTER — ONCOLOGY VISIT (OUTPATIENT)
Dept: ONCOLOGY | Facility: CLINIC | Age: 38
End: 2018-08-22
Attending: INTERNAL MEDICINE
Payer: COMMERCIAL

## 2018-08-22 ENCOUNTER — RADIANT APPOINTMENT (OUTPATIENT)
Dept: CT IMAGING | Facility: CLINIC | Age: 38
End: 2018-08-22
Payer: COMMERCIAL

## 2018-08-22 VITALS
TEMPERATURE: 97.9 F | SYSTOLIC BLOOD PRESSURE: 96 MMHG | RESPIRATION RATE: 18 BRPM | WEIGHT: 176.7 LBS | BODY MASS INDEX: 23.93 KG/M2 | DIASTOLIC BLOOD PRESSURE: 68 MMHG | HEIGHT: 72 IN | OXYGEN SATURATION: 99 % | HEART RATE: 89 BPM

## 2018-08-22 DIAGNOSIS — M79.89 SOFT TISSUE MASS: ICD-10-CM

## 2018-08-22 DIAGNOSIS — M79.89 SOFT TISSUE MASS: Primary | ICD-10-CM

## 2018-08-22 DIAGNOSIS — D47.9 LYMPHOPROLIFERATIVE DISORDER (H): ICD-10-CM

## 2018-08-22 LAB
ALBUMIN SERPL-MCNC: 3.1 G/DL (ref 3.4–5)
ALP SERPL-CCNC: 61 U/L (ref 40–150)
ALT SERPL W P-5'-P-CCNC: 17 U/L (ref 0–50)
ANION GAP SERPL CALCULATED.3IONS-SCNC: 7 MMOL/L (ref 3–14)
AST SERPL W P-5'-P-CCNC: 19 U/L (ref 0–45)
BASOPHILS # BLD AUTO: 0 10E9/L (ref 0–0.2)
BASOPHILS NFR BLD AUTO: 0 %
BILIRUB SERPL-MCNC: 0.4 MG/DL (ref 0.2–1.3)
BUN SERPL-MCNC: 20 MG/DL (ref 7–30)
CALCIUM SERPL-MCNC: 7.8 MG/DL (ref 8.5–10.1)
CANCER AG125 SERPL-ACNC: <5 U/ML (ref 0–30)
CHLORIDE SERPL-SCNC: 108 MMOL/L (ref 94–109)
CO2 SERPL-SCNC: 22 MMOL/L (ref 20–32)
CREAT SERPL-MCNC: 0.92 MG/DL (ref 0.52–1.04)
CRP SERPL-MCNC: 11.8 MG/L (ref 0–8)
DIFFERENTIAL METHOD BLD: ABNORMAL
EOSINOPHIL # BLD AUTO: 0 10E9/L (ref 0–0.7)
EOSINOPHIL NFR BLD AUTO: 0 %
ERYTHROCYTE [DISTWIDTH] IN BLOOD BY AUTOMATED COUNT: 14.4 % (ref 10–15)
ERYTHROCYTE [SEDIMENTATION RATE] IN BLOOD BY WESTERGREN METHOD: 59 MM/H (ref 0–20)
GFR SERPL CREATININE-BSD FRML MDRD: 68 ML/MIN/1.7M2
GLUCOSE SERPL-MCNC: 86 MG/DL (ref 70–99)
HBV CORE AB SERPL QL IA: NONREACTIVE
HBV SURFACE AB SERPL IA-ACNC: 1.04 M[IU]/ML
HBV SURFACE AG SERPL QL IA: NONREACTIVE
HCT VFR BLD AUTO: 29.4 % (ref 35–47)
HCV AB SERPL QL IA: NONREACTIVE
HGB BLD-MCNC: 9.7 G/DL (ref 11.7–15.7)
IGA SERPL-MCNC: 66 MG/DL (ref 70–380)
IGG SERPL-MCNC: 1630 MG/DL (ref 695–1620)
IGM SERPL-MCNC: 68 MG/DL (ref 60–265)
IMM GRANULOCYTES # BLD: 0 10E9/L (ref 0–0.4)
IMM GRANULOCYTES NFR BLD: 0.8 %
KAPPA LC UR-MCNC: 7.19 MG/DL (ref 0.33–1.94)
KAPPA LC/LAMBDA SER: 3.74 {RATIO} (ref 0.26–1.65)
LAMBDA LC SERPL-MCNC: 1.92 MG/DL (ref 0.57–2.63)
LDH SERPL L TO P-CCNC: 170 U/L (ref 81–234)
LYMPHOCYTES # BLD AUTO: 0.3 10E9/L (ref 0.8–5.3)
LYMPHOCYTES NFR BLD AUTO: 7.5 %
MCH RBC QN AUTO: 33.1 PG (ref 26.5–33)
MCHC RBC AUTO-ENTMCNC: 33 G/DL (ref 31.5–36.5)
MCV RBC AUTO: 100 FL (ref 78–100)
MONOCYTES # BLD AUTO: 0.1 10E9/L (ref 0–1.3)
MONOCYTES NFR BLD AUTO: 3.6 %
NEUTROPHILS # BLD AUTO: 3.2 10E9/L (ref 1.6–8.3)
NEUTROPHILS NFR BLD AUTO: 88.1 %
NRBC # BLD AUTO: 0 10*3/UL
NRBC BLD AUTO-RTO: 0 /100
PLATELET # BLD AUTO: 118 10E9/L (ref 150–450)
POTASSIUM SERPL-SCNC: 3.9 MMOL/L (ref 3.4–5.3)
PROT SERPL-MCNC: 6.9 G/DL (ref 6.8–8.8)
RADIOLOGIST FLAGS: NORMAL
RBC # BLD AUTO: 2.93 10E12/L (ref 3.8–5.2)
SODIUM SERPL-SCNC: 137 MMOL/L (ref 133–144)
URATE SERPL-MCNC: 5.1 MG/DL (ref 2.6–6)
WBC # BLD AUTO: 3.6 10E9/L (ref 4–11)

## 2018-08-22 PROCEDURE — 83615 LACTATE (LD) (LDH) ENZYME: CPT | Performed by: INTERNAL MEDICINE

## 2018-08-22 PROCEDURE — 87799 DETECT AGENT NOS DNA QUANT: CPT | Performed by: INTERNAL MEDICINE

## 2018-08-22 PROCEDURE — 82784 ASSAY IGA/IGD/IGG/IGM EACH: CPT | Performed by: INTERNAL MEDICINE

## 2018-08-22 PROCEDURE — 86304 IMMUNOASSAY TUMOR CA 125: CPT | Performed by: INTERNAL MEDICINE

## 2018-08-22 PROCEDURE — 00000402 ZZHCL STATISTIC TOTAL PROTEIN: Performed by: INTERNAL MEDICINE

## 2018-08-22 PROCEDURE — 80053 COMPREHEN METABOLIC PANEL: CPT | Performed by: INTERNAL MEDICINE

## 2018-08-22 PROCEDURE — 85025 COMPLETE CBC W/AUTO DIFF WBC: CPT | Performed by: INTERNAL MEDICINE

## 2018-08-22 PROCEDURE — 83883 ASSAY NEPHELOMETRY NOT SPEC: CPT | Performed by: INTERNAL MEDICINE

## 2018-08-22 PROCEDURE — 85652 RBC SED RATE AUTOMATED: CPT | Performed by: INTERNAL MEDICINE

## 2018-08-22 PROCEDURE — 84550 ASSAY OF BLOOD/URIC ACID: CPT | Performed by: INTERNAL MEDICINE

## 2018-08-22 PROCEDURE — 86704 HEP B CORE ANTIBODY TOTAL: CPT | Performed by: INTERNAL MEDICINE

## 2018-08-22 PROCEDURE — 87340 HEPATITIS B SURFACE AG IA: CPT | Performed by: INTERNAL MEDICINE

## 2018-08-22 PROCEDURE — 36415 COLL VENOUS BLD VENIPUNCTURE: CPT | Performed by: INTERNAL MEDICINE

## 2018-08-22 PROCEDURE — 99205 OFFICE O/P NEW HI 60 MIN: CPT | Mod: GC | Performed by: INTERNAL MEDICINE

## 2018-08-22 PROCEDURE — 86140 C-REACTIVE PROTEIN: CPT | Performed by: INTERNAL MEDICINE

## 2018-08-22 PROCEDURE — 84165 PROTEIN E-PHORESIS SERUM: CPT | Performed by: INTERNAL MEDICINE

## 2018-08-22 PROCEDURE — 86803 HEPATITIS C AB TEST: CPT | Performed by: INTERNAL MEDICINE

## 2018-08-22 PROCEDURE — G0463 HOSPITAL OUTPT CLINIC VISIT: HCPCS | Mod: ZF

## 2018-08-22 PROCEDURE — 86706 HEP B SURFACE ANTIBODY: CPT | Performed by: INTERNAL MEDICINE

## 2018-08-22 RX ORDER — IOPAMIDOL 755 MG/ML
86 INJECTION, SOLUTION INTRAVASCULAR ONCE
Status: COMPLETED | OUTPATIENT
Start: 2018-08-22 | End: 2018-08-22

## 2018-08-22 RX ADMIN — IOPAMIDOL 86 ML: 755 INJECTION, SOLUTION INTRAVASCULAR at 10:03

## 2018-08-22 ASSESSMENT — PAIN SCALES - GENERAL: PAINLEVEL: NO PAIN (0)

## 2018-08-22 NOTE — LETTER
"8/22/2018      RE: Lilia Correia  [address] [facility] [id number]       [facility] New Patient Visit    Date of Service: 08/22/18        Oncologic diagnosis:     Soft tissue mass in the setting of lupus, referred by Dr. Andrey Barrow.         HPI and ROS:     HPI: Lilia Correia is a 38 year old female with a PMH of systemic lupus, for which she has received immunosuppressive treatment. She also has a history of an immunosuppression-associated lymphoproliferative disorder as well as past CMV and PJP pneumonitis. The soft tissue mass was an incidental finding on a recent CT scan obtained in 07/2018. The patient is accompanied today by her sister and mother.    Regarding her history of the \"lymphoproliferative disorder\", she was on immunosuppression for her systemic lupus when in June 2013 she noticed facial numbness.   She saw a physician in [place] and imaging was obtained which showed \"spots on her brain\" per patient's sister.  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  She was quite ill.  She was taking CellCept at the time and she was referred to [facility] in the fall of 2013 and the CellCept was stopped and at this spots apparently went away and symptoms resolved without recurrence.      She is currently followed at [facility] by Dr. Jaquan Mckeon for a long-standing history of lupus (2003), which remains active. Complications include nephritis (2006), cytopenias, alopecia, polyarticular arthritis, rash, lupus anticoagulant and DVT. 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 Currently, she is on chloroquine(01/2014) with low dose methotrexate (03/2018) and low dose prednisone with bursts of higher dose steroids for exacerbations of arthritis. The methotrexate was added after having more " problems with her lupus in January - February. No recent changes in her systemic lupus.    Ms. Correia was having heartburn and occasional nausea and feeling like throat closing at times after eating for a number of years with an unclear cause. This led to evaluation with a CT scan in 7/2018 which was notable for an indeterminate, partially calcified mass involving the left pelvic sidewall measuring up to 6 cm suspicious for a primary neoplasm. She underwent a CT-guided biopsy as recommended by Dr. Barrow and this was notable for an atypical lymphoplasmacytic infiltrate with no diagnostic evidence of lymphoma.  She was referred to our clinic for further evaluation of the atypical lymphoplasmacytic infiltrate in the setting of a soft tissue mass. She also underwent an upper endoscopy (07/19/2018) - biopsies showed hemosiderin in villous tips of duodenum with no evidence of celiac or Whipple's disease, parasites or viral inclusions. The stomach biopsy showed antral type mucosa with mild nonspecific reactive-type changes. No histopathologic evidence of H pylori.    ROS: Polyarthralgias. Denies any lumps or bumps, fevers, chills, night sweats, recent weight loss, residual facial numbness or any neurologic symptoms after her episode of a lymphoproliferative disorder in 2013  No bruising or bleeding. She is on Xarelto for approximately 5 years, chest pain, shortness of breath, cough, hemoptysis, rash, recent infections, change in menstrual cycles.     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     Social History Main Topics     Smoking status: Current Every Day Smoker     Packs/day: 0.50     Years: 20.00     Types: Cigarettes     Start date: 1/1/1998     Smokeless tobacco: Never Used     Alcohol use No     Drug use: No     Sexual activity: No     Other Topics Concern     Not on file     Social History Narrative     Lives in [place]. No children. Works in finance. Tobacco: 1/2 pack per day. Smoking since high school.  Alcohol: rarely. Drugs: none         Family History:     Family History   Problem Relation Age of Onset     Depression Mother      Hypertension Father      Ovarian Cancer Maternal Grandmother 75     HEART DISEASE Maternal Grandfather 54     Dementia Paternal Grandmother 80     Other - See Comments Paternal Grandfather 92     old age      1st-cousin on father's side, diagnosed with lymphoma in late 30s. No other known cancer history in the family         Allergies:     Allergies   Allergen Reactions     Amoxicillin-Pot Clavulanate Other (See Comments)     Lip and tongue swelling     Sulfamethoxazole-Trimethoprim Rash     Worsening cytopenia, kidney injury, rash 9/2013.   Worsening cytopenia, kidney injury, rash 9/2013.      Tape  [Adhesive Tape] Itching     Sulfa Drugs Unknown and Other (See Comments)     Kidney failure             Medications:     Cholecalciferol, Vitamin D3, 1,000 unit oral Tablet, Chewable   Chew and Swallow 5,000 Units by mouth Daily.   0     Active   acetaminophen (AKA: TYLENOL) 500 mg oral Tablet   Take 1-2 Tabs by mouth every 8 hours as needed for  pain. For leg cramps   0 10/01/2013   Active   magnesium 250 mg oral Tablet   Take 250 mg by mouth once daily.   0     Active   CRANBERRY EXTRACT (CRANBERRY ORAL)   Take 1 Tab by mouth Daily.   0     Active   FERROUS FUMARATE (IRON ORAL)   Take 2 Tabs by mouth Daily.   0     Active   lisinopril (AKA: PRINIVIL/ZESTRIL) 5 mg oral Tablet   TAKE ONE TABLET BY MOUTH ONCE DAILY 90 tablet   3 10/26/2016   Active   levonorgestrel-ethinyl estradiol (AKA: SEASONALE CONTRACEPTIVE) 0.15 mg-30 mcg oral tablet   Take 1 tablet by mouth daily.   0     Active   folic acid 400 mcg oral Tablet   Take 800 mcg by mouth.   0     Active   predniSONE (AKA: DELTASONE) 5 mg oral Tablet    Indications: Systemic lupus erythematosus, unspecified SLE type, unspecified organ involvement status (HCC) Take 1 tablet (5 mg total) by mouth once daily.   0 03/19/2018   Active   acetaminophen-codeine (AKA: TYLENOL #3) 300-30 mg oral Tablet    Indications: Systemic lupus erythematosus, unspecified SLE type, unspecified organ involvement status (HCC) Take 1-2 tablets by mouth every 6 hours as needed for severe pain. 30 tablet   0 03/22/2018   Active   chloroquine (AKA: ARALEN) 250 mg oral Tablet    Indications: Systemic lupus erythematosus, unspecified SLE type, unspecified organ involvement status (HCC) Take 1 tablet (250 mg total) by mouth once daily. 200 tablet   1 05/24/2018   Active   methotrexate 2.5 mg oral Tablet    Indications: Systemic lupus erythematosus, unspecified SLE type, unspecified organ involvement status (HCC) Take 6 tablets (15 mg total) by mouth once weekly.   0 06/18/2018   Active   Biotin 10,000 mcg oral Capsule   Take 20,000 mcg by mouth once daily.   0     Active   rivaroxaban (XARELTO) 20 mg oral Tablet    Indications: History of DVT (deep vein thrombosis) Take 1 tablet (20 mg total) by mouth once daily. Begin 10/19/13, for right leg DVT (blood clot). Refills to Dr. Rodrigo Jaime, [facility]. 30 tablet   0 07/21/2018    "Active   omeprazole magnesium (AKA: PRILOSEC OTC) 20 mg oral Tablet, Delayed Release (E.C.)    Indications: Vomiting, intractability of vomiting not specified, presence of nausea not specified, unspecified vomiting type Take 1 tablet (20 mg total) by mouth once daily. 90 tablet   3 07/20/2018 07/20/2019 Active   methotrexate 2.5 mg oral Tablet    Indications: Systemic lupus erythematosus, unspecified SLE type, unspecified organ involvement status (HCC)           No recent changes         Exam:   BP 96/68 (BP Location: Left arm, Patient Position: Sitting, Cuff Size: Adult Regular)  Pulse 89  Temp 97.9  F (36.6  C) (Tympanic)  Resp 18  Ht 1.83 m (6' 0.05\")  Wt 80.2 kg (176 lb 11.2 oz)  LMP 08/02/2018  SpO2 99%  BMI 23.93 kg/m2  General: NAD, appears calm, interactive but quiet, sister often answering questions.  HEENT: Anicteric, no conjunctival injection, PERRL, EOM full, no oropharyngeal masses, mucosa - moist without evident lesions.  NECK: no cervical/supraclavicular nodes. Thyroid without mass.  AXILLAE: no nodes.  CHEST: clear to ausculation/percussion, normal vesicular breath sounds   HEART: RRR, normal S1 and S2, no m/r/g  ABDOMEN: Soft, non-tender, non-distended. Normoactive bowel sounds. No organomegaly or mass. No inguinal/femoral nodes.  SKIN: no rashes on areas of exposed skin. No petechiae or ecchymoses noted.    EXTREMITIES: no edema, no epitrochlear nodes.     NEUROLOGICAL: alert, conversing appropriately. Cranial nerves intact. Gait normal. Grossly w/o focal deficits          Labs:     (08/22/2018)    Hemoglobin 9.7 gm%, , WBC 3600 (88% P, 8% L) () and 118,000 platelets  Electrolytes - normal, Ca 7.8 (albumin 3.1), uric acid 5.1  Liver enzymes, including LDH - normal  ESR 59, CRP 11.8   - <5  EBV DNA 4435, log 3.6  Hepatitis B/C serologies - nonreactive  SPEP - albumin 3.2, gamma fraction 1.4, small monoclonal peak (0.2)  IgA 66 (), Ig G 1630 (695-1620), IgM 68 " ()  Echo free light chain 7.19* (0.33-1.94), lambda free light chain (1.92 (0.57-2.63), kappa:lambda ratio 3.74* (0.)    Pathology  SPECIMEN:  (08/01/2018)  Left retroperitoneal mass biopsy, CT guided    FINAL DIAGNOSIS:   Retroperitoneal mass, left, biopsy:   -- Atypical lymphoplasmacytic infiltrate, non diagnostic for lymphoma or lymphoproliferative disorder     COMMENT: Concurrent flow cytometry reports ([id number]) polytypic B cells and no aberrant immunophenotype on T cells. Immunostains exclude IgG4 sclerosing disease.     If there is strong clinical concern for a hematolymphoid or other non-hematopoietic neoplasm (with adjacent lymphoplasmacytic infiltrate), a rebiopsy may be indicated.     Flow cytometry  INTERPRETATION:   -- Polytypic B cells   -- No aberrant immunophenotype on T cells     COMMENT: There is no immunophenotypic evidence of non-Hodgkin lymphoma.    CT abdomen from 7/11/2018 (outside)      CT chest 8/22/2018  IMPRESSION:   1. Scattered pulmonary nodules, largest is an indeterminate 7 mm spiculated nodule in the right upper lobe. Recommend follow-up chest CT in 3-6 months.  2. Moderate centrilobular emphysema.         Assessment and Plan:     This is a 38 year old female with a PMH of systemic lupus, hypertension, history of immunosuppression associated lymphoproliferative disorder that was significantly associated, CMV pneumonitis presents to clinic for evaluation of a soft tissue mass.    #  Soft tissue mass  #  Atypical lymphoplasmacytic infiltrate  #  History of a lymphoproliferative disorder, EBV positive    The mass identified on CT is asymptomatic and the biopsy does not show evidence of lymphoma microscopically or by immunologic studies.  We discussed that an atypical lymphoplasmacytic infiltrate can be found in inflammation and is not a specific finding either in a patient on immunosuppression or with lupus. She has a history of a lymphoproliferative disorder which was  reportedly EBV associated and this could potentially be reactivating so we checked circulating EBV in the blood, which was present only at a low lever.This is not surprising in an immunocompromised host and not suggestive of an etiology.  We discussed that the differential for her soft tissue masses is very broad, including both benign and malignant conditions, and, unfortunately the biopsy does not allow confirmation of either a benign or malignant process.  We discussed we will likely need a further biopsy to establish investigate the nature of this mass.      Because the physical examination did not disclose adenopathy or other sites to consider biopsying, we obtained a chest CT after the visit to look for other areas that might,. The CT does not show abnormalities that would likely be helpful to biopsy, but does have an abnormality that either should be compared to any previous chest CT scans or the scan, itself, should be repeated in 3-6 months. We discussed that this could have been there for a while given that it has some calcification. The outside CT reports states the recent 07/2018 abdominal-pelvic CT was compared to a previous CT from 09/2013. However, the report does not mention whether the mass was present earlier (presumably not, but this should be clarified). (ADDENDUM - Dr. Barrow was able to review and this was a chest CT.)    The laboratory studies document moderate anemia and cytopenias, inflammation, low level of circulating EBV, a very small monoclonal protein and a high kappa:lambda ratio. None of these is diagnostic of a malignancy - all being consistent with an inflammatory state, such as lupus or an immunosuppressed state. These results could be compared with past studies in this patient, if available.    The differential diagnosis is broad and includes sarcoma, granuloma, teratoma, hematoma, lymphoma, etc. The  is less than 5.     Recommendations  - after the results are back, plan to  communicate with Dr. Barrow. Suspect that we will need another biopsy to establishe diagnosis  - seek additional information from previous outside CT's (2013) regarding pulmonary nodule and pelvic mass  - communicate the results with the patient    #  Pancytopenia:  Recent labs outside facility did not show pancytopenia. Hemoglobin was 11.6 on 07/18/2018 and in that range for much of the past year. She does have elevated inflammatory markers and is on methotrexate so potentially could be a reflection of her lupus or medications.   At this time we do not have any evidence of a primary bone marrow process as the primary  of her pancytopenia.       Recommendations  - monitor    #  Pulmonary nodules:  Incidental finding on CT scan.  We received the result of the CT scan after the patient left.  She does have a smoking history and is immunocompromised.  Lung nodule is 7 mm; too small to biopsy. As noted above, chest CT was obtained in 2013.    Recommendations  - unless seen on any prior chest imaging and stable, follow-up would be in approximately 3-6 months per Radiology is warranted.     Discuss with Dr. Barrow. Monitor results of ongoing evaluation and see back as appropriate.    Patient was seen and discussed with Dr. Baljit Noriega MD   PGY6  Heme onc fellow    Oncology Attending    Patient seen and examined with the Oncology Fellow, Dr. Noriega. Agree with his findings, assessment and plan. We spent over an hour with patient and her family, the majority of time in counseling.    Wisam Smiley MD  Professor of Medicine  Oncology  [facility]  Office: [phone]  Clinic Fax: [phone]    CC:  MD Anika Llanos MD Aaron Holmgren, MD    ADDENDUM (08/29/2018): Reviewed findings with Dr. Barrow yesterday. He was able to determine that CT scan from 2013 was a chest CT. This would have been around the time of her viral infections. Unfortunately, a chest CT would not  assist in determining whether the pelvic mass was present at that time. I spoke with Ms. Bren today and reviewed the results. There are some abnormalities that may need follow up, such as the anemia, monoclonal protein, elevated kappa/lambda ratio and chest CT findings, but the next step would be an open biopsy of the pelvic mass with appropriate ancillary studies, including those for a lymphoproliferative process. Dr. Barrow's staff will be contacting MsRoosevelt Bren to arrange. We will follow-up as appropriate. Patient acknowledged understanding. (bap)      Wisam Smiley MD

## 2018-08-22 NOTE — NURSING NOTE
"Oncology Rooming Note    August 22, 2018 8:18 AM   Lilia Correia is a 38 year old female who presents for:    Chief Complaint   Patient presents with     Oncology Clinic Visit     New patient visit related to Lymphoma     Initial Vitals: BP 96/68 (BP Location: Left arm, Patient Position: Sitting, Cuff Size: Adult Regular)  Pulse 89  Temp 97.9  F (36.6  C) (Tympanic)  Resp 18  Ht 1.83 m (6' 0.05\")  Wt 80.2 kg (176 lb 11.2 oz)  LMP 08/02/2018  SpO2 99%  BMI 23.93 kg/m2 Estimated body mass index is 23.93 kg/(m^2) as calculated from the following:    Height as of this encounter: 1.83 m (6' 0.05\").    Weight as of this encounter: 80.2 kg (176 lb 11.2 oz). Body surface area is 2.02 meters squared.  No Pain (0) Comment: Data Unavailable   Patient's last menstrual period was 08/02/2018.  Allergies reviewed: Yes  Medications reviewed: Yes    Medications: Medication refills not needed today.  Pharmacy name entered into EPIC: Data Unavailable    Clinical concerns: No new concerns. Provider was notified.    10 minutes for nursing intake (face to face time)     Jaelyn Ramos LPN            "

## 2018-08-22 NOTE — Clinical Note
Ordered for patient labs to be done for today. Patient is from far away and would ideally like CT chest done today. Please come into patient room to coordinate lab test. Thanks.

## 2018-08-22 NOTE — DISCHARGE INSTRUCTIONS

## 2018-08-22 NOTE — PROGRESS NOTES
"[facility] New Patient Visit    Date of Service: 08/22/18        Oncologic diagnosis:     Soft tissue mass in the setting of lupus, referred by Dr. Andrey Barrow.         HPI and ROS:     HPI: Lilia Correia is a 38 year old female with a PMH of systemic lupus, for which she has received immunosuppressive treatment. She also has a history of an immunosuppression-associated lymphoproliferative disorder as well as past CMV and PJP pneumonitis. The soft tissue mass was an incidental finding on a recent CT scan obtained in 07/2018. The patient is accompanied today by her sister and mother.    Regarding her history of the \"lymphoproliferative disorder\", she was on immunosuppression for her systemic lupus when in June 2013 she noticed facial numbness.   She saw a physician in [place] and imaging was obtained which showed \"spots on her brain\" per patient's sister.  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  She was quite ill.  She was taking CellCept at the time and she was referred to [facility] in the fall of 2013 and the CellCept was stopped and at this spots apparently went away and symptoms resolved without recurrence.      She is currently followed at [facility] by Dr. Jaquan Mckeon for a long-standing history of lupus (2003), which remains active. Complications include nephritis (2006), cytopenias, alopecia, polyarticular arthritis, rash, lupus anticoagulant and DVT. 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 Currently, she is on chloroquine(01/2014) with low dose methotrexate (03/2018) and low dose prednisone with bursts of higher dose steroids for exacerbations of arthritis. The methotrexate was added after having more problems with her lupus in January - February. No recent changes in her " systemic lupus.    Ms. Correia was having heartburn and occasional nausea and feeling like throat closing at times after eating for a number of years with an unclear cause. This led to evaluation with a CT scan in 7/2018 which was notable for an indeterminate, partially calcified mass involving the left pelvic sidewall measuring up to 6 cm suspicious for a primary neoplasm. She underwent a CT-guided biopsy as recommended by Dr. Barrow and this was notable for an atypical lymphoplasmacytic infiltrate with no diagnostic evidence of lymphoma.  She was referred to our clinic for further evaluation of the atypical lymphoplasmacytic infiltrate in the setting of a soft tissue mass. She also underwent an upper endoscopy (07/19/2018) - biopsies showed hemosiderin in villous tips of duodenum with no evidence of celiac or Whipple's disease, parasites or viral inclusions. The stomach biopsy showed antral type mucosa with mild nonspecific reactive-type changes. No histopathologic evidence of H pylori.    ROS: Polyarthralgias. Denies any lumps or bumps, fevers, chills, night sweats, recent weight loss, residual facial numbness or any neurologic symptoms after her episode of a lymphoproliferative disorder in 2013  No bruising or bleeding. She is on Xarelto for approximately 5 years, chest pain, shortness of breath, cough, hemoptysis, rash, recent infections, change in menstrual cycles.     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     Social History Main Topics     Smoking status: Current Every Day Smoker     Packs/day: 0.50     Years: 20.00     Types: Cigarettes     Start date: 1/1/1998     Smokeless tobacco: Never Used     Alcohol use No     Drug use: No     Sexual activity: No     Other Topics Concern     Not on file     Social History Narrative     Lives in [place]. No children. Works in finance. Tobacco: 1/2 pack per day. Smoking since high school.  Alcohol: rarely. Drugs: none         Family History:     Family History   Problem Relation Age of Onset     Depression Mother      Hypertension Father      Ovarian Cancer Maternal Grandmother 75     HEART DISEASE Maternal Grandfather 54     Dementia Paternal Grandmother 80     Other - See Comments Paternal Grandfather 92     old age      1st-cousin on father's side, diagnosed with lymphoma in late 30s. No other known cancer history in the family         Allergies:     Allergies   Allergen Reactions     Amoxicillin-Pot Clavulanate Other (See Comments)     Lip and tongue swelling     Sulfamethoxazole-Trimethoprim Rash     Worsening cytopenia, kidney injury, rash 9/2013.   Worsening cytopenia, kidney injury, rash 9/2013.      Tape  [Adhesive Tape] Itching     Sulfa Drugs Unknown and Other (See Comments)     Kidney failure             Medications:     Cholecalciferol, Vitamin D3, 1,000 unit oral Tablet, Chewable   Chew and Swallow 5,000 Units by mouth Daily.   0     Active   acetaminophen (AKA: TYLENOL) 500 mg oral Tablet   Take 1-2 Tabs by mouth every 8 hours as needed for pain. For leg cramps   0 10/01/2013   Active   magnesium 250 mg oral Tablet    Take 250 mg by mouth once daily.   0     Active   CRANBERRY EXTRACT (CRANBERRY ORAL)   Take 1 Tab by mouth Daily.   0     Active   FERROUS FUMARATE (IRON ORAL)   Take 2 Tabs by mouth Daily.   0     Active   lisinopril (AKA: PRINIVIL/ZESTRIL) 5 mg oral Tablet   TAKE ONE TABLET BY MOUTH ONCE DAILY 90 tablet   3 10/26/2016   Active   levonorgestrel-ethinyl estradiol (AKA: SEASONALE CONTRACEPTIVE) 0.15 mg-30 mcg oral tablet   Take 1 tablet by mouth daily.   0     Active   folic acid 400 mcg oral Tablet   Take 800 mcg by mouth.   0     Active   predniSONE (AKA: DELTASONE) 5 mg oral Tablet    Indications: Systemic lupus erythematosus, unspecified SLE type, unspecified organ involvement status (HCC) Take 1 tablet (5 mg total) by mouth once daily.   0 03/19/2018   Active   acetaminophen-codeine (AKA: TYLENOL #3) 300-30 mg oral Tablet    Indications: Systemic lupus erythematosus, unspecified SLE type, unspecified organ involvement status (HCC) Take 1-2 tablets by mouth every 6 hours as needed for severe pain. 30 tablet   0 03/22/2018   Active   chloroquine (AKA: ARALEN) 250 mg oral Tablet    Indications: Systemic lupus erythematosus, unspecified SLE type, unspecified organ involvement status (HCC) Take 1 tablet (250 mg total) by mouth once daily. 200 tablet   1 05/24/2018   Active   methotrexate 2.5 mg oral Tablet    Indications: Systemic lupus erythematosus, unspecified SLE type, unspecified organ involvement status (HCC) Take 6 tablets (15 mg total) by mouth once weekly.   0 06/18/2018   Active   Biotin 10,000 mcg oral Capsule   Take 20,000 mcg by mouth once daily.   0     Active   rivaroxaban (XARELTO) 20 mg oral Tablet    Indications: History of DVT (deep vein thrombosis) Take 1 tablet (20 mg total) by mouth once daily. Begin 10/19/13, for right leg DVT (blood clot). Refills to Dr. Rodrigo Jaime, [facility]. 30 tablet   0 07/21/2018   Active   omeprazole magnesium (AKA: PRILOSEC OTC) 20 mg oral Tablet, Delayed  "Release (E.C.)    Indications: Vomiting, intractability of vomiting not specified, presence of nausea not specified, unspecified vomiting type Take 1 tablet (20 mg total) by mouth once daily. 90 tablet   3 07/20/2018 07/20/2019 Active   methotrexate 2.5 mg oral Tablet    Indications: Systemic lupus erythematosus, unspecified SLE type, unspecified organ involvement status (HCC)           No recent changes         Exam:   BP 96/68 (BP Location: Left arm, Patient Position: Sitting, Cuff Size: Adult Regular)  Pulse 89  Temp 97.9  F (36.6  C) (Tympanic)  Resp 18  Ht 1.83 m (6' 0.05\")  Wt 80.2 kg (176 lb 11.2 oz)  LMP 08/02/2018  SpO2 99%  BMI 23.93 kg/m2  General: NAD, appears calm, interactive but quiet, sister often answering questions.  HEENT: Anicteric, no conjunctival injection, PERRL, EOM full, no oropharyngeal masses, mucosa - moist without evident lesions.  NECK: no cervical/supraclavicular nodes. Thyroid without mass.  AXILLAE: no nodes.  CHEST: clear to ausculation/percussion, normal vesicular breath sounds   HEART: RRR, normal S1 and S2, no m/r/g  ABDOMEN: Soft, non-tender, non-distended. Normoactive bowel sounds. No organomegaly or mass. No inguinal/femoral nodes.  SKIN: no rashes on areas of exposed skin. No petechiae or ecchymoses noted.    EXTREMITIES: no edema, no epitrochlear nodes.     NEUROLOGICAL: alert, conversing appropriately. Cranial nerves intact. Gait normal. Grossly w/o focal deficits          Labs:     (08/22/2018)    Hemoglobin 9.7 gm%, , WBC 3600 (88% P, 8% L) () and 118,000 platelets  Electrolytes - normal, Ca 7.8 (albumin 3.1), uric acid 5.1  Liver enzymes, including LDH - normal  ESR 59, CRP 11.8   - <5  EBV DNA 4435, log 3.6  Hepatitis B/C serologies - nonreactive  SPEP - albumin 3.2, gamma fraction 1.4, small monoclonal peak (0.2)  IgA 66 (), Ig G 1630 (695-1620), IgM 68 ()  [place] free light chain 7.19* (0.33-1.94), lambda free light chain (1.92 " (0.57-2.63), kappa:lambda ratio 3.74* (0.)    Pathology  SPECIMEN:  (08/01/2018)  Left retroperitoneal mass biopsy, CT guided    FINAL DIAGNOSIS:   Retroperitoneal mass, left, biopsy:   -- Atypical lymphoplasmacytic infiltrate, non diagnostic for lymphoma or lymphoproliferative disorder     COMMENT: Concurrent flow cytometry reports ([id number]) polytypic B cells and no aberrant immunophenotype on T cells. Immunostains exclude IgG4 sclerosing disease.     If there is strong clinical concern for a hematolymphoid or other non-hematopoietic neoplasm (with adjacent lymphoplasmacytic infiltrate), a rebiopsy may be indicated.     Flow cytometry  INTERPRETATION:   -- Polytypic B cells   -- No aberrant immunophenotype on T cells     COMMENT: There is no immunophenotypic evidence of non-Hodgkin lymphoma.    CT abdomen from 7/11/2018 (outside)      CT chest 8/22/2018  IMPRESSION:   1. Scattered pulmonary nodules, largest is an indeterminate 7 mm spiculated nodule in the right upper lobe. Recommend follow-up chest CT in 3-6 months.  2. Moderate centrilobular emphysema.         Assessment and Plan:     This is a 38 year old female with a PMH of systemic lupus, hypertension, history of immunosuppression associated lymphoproliferative disorder that was significantly associated, CMV pneumonitis presents to clinic for evaluation of a soft tissue mass.    #  Soft tissue mass  #  Atypical lymphoplasmacytic infiltrate  #  History of a lymphoproliferative disorder, EBV positive    The mass identified on CT is asymptomatic and the biopsy does not show evidence of lymphoma microscopically or by immunologic studies.  We discussed that an atypical lymphoplasmacytic infiltrate can be found in inflammation and is not a specific finding either in a patient on immunosuppression or with lupus. She has a history of a lymphoproliferative disorder which was reportedly EBV associated and this could potentially be reactivating so we checked  circulating EBV in the blood, which was present only at a low lever.This is not surprising in an immunocompromised host and not suggestive of an etiology.  We discussed that the differential for her soft tissue masses is very broad, including both benign and malignant conditions, and, unfortunately the biopsy does not allow confirmation of either a benign or malignant process.  We discussed we will likely need a further biopsy to establish investigate the nature of this mass.      Because the physical examination did not disclose adenopathy or other sites to consider biopsying, we obtained a chest CT after the visit to look for other areas that might,. The CT does not show abnormalities that would likely be helpful to biopsy, but does have an abnormality that either should be compared to any previous chest CT scans or the scan, itself, should be repeated in 3-6 months. We discussed that this could have been there for a while given that it has some calcification. The outside CT reports states the recent 07/2018 abdominal-pelvic CT was compared to a previous CT from 09/2013. However, the report does not mention whether the mass was present earlier (presumably not, but this should be clarified). (ADDENDUM - Dr. Barrow was able to review and this was a chest CT.)    The laboratory studies document moderate anemia and cytopenias, inflammation, low level of circulating EBV, a very small monoclonal protein and a high kappa:lambda ratio. None of these is diagnostic of a malignancy - all being consistent with an inflammatory state, such as lupus or an immunosuppressed state. These results could be compared with past studies in this patient, if available.    The differential diagnosis is broad and includes sarcoma, granuloma, teratoma, hematoma, lymphoma, etc. The  is less than 5.     Recommendations  - after the results are back, plan to communicate with Dr. Barrow. Suspect that we will need another biopsy to establishe  diagnosis  - seek additional information from previous outside CT's (2013) regarding pulmonary nodule and pelvic mass  - communicate the results with the patient    #  Pancytopenia:  Recent labs outside facility did not show pancytopenia. Hemoglobin was 11.6 on 07/18/2018 and in that range for much of the past year. She does have elevated inflammatory markers and is on methotrexate so potentially could be a reflection of her lupus or medications.   At this time we do not have any evidence of a primary bone marrow process as the primary  of her pancytopenia.       Recommendations  - monitor    #  Pulmonary nodules:  Incidental finding on CT scan.  We received the result of the CT scan after the patient left.  She does have a smoking history and is immunocompromised.  Lung nodule is 7 mm; too small to biopsy. As noted above, chest CT was obtained in 2013.    Recommendations  - unless seen on any prior chest imaging and stable, follow-up would be in approximately 3-6 months per Radiology is warranted.     Discuss with Dr. Barrow. Monitor results of ongoing evaluation and see back as appropriate.    Patient was seen and discussed with Dr. Baljit Noriega MD   PGY6  Heme onc fellow    Oncology Attending    Patient seen and examined with the Oncology Fellow, Dr. Noriega. Agree with his findings, assessment and plan. We spent over an hour with patient and her family, the majority of time in counseling.    Wisam Smiley MD  Professor of Medicine  Oncology  [facility]  Office: [phone]  Clinic Fax: [phone]    CC:  MD Anika Llanos MD Aaron Holmgren, MD    ADDENDUM (08/29/2018): Reviewed findings with Dr. Barrow yesterday. He was able to determine that CT scan from 2013 was a chest CT. This would have been around the time of her viral infections. Unfortunately, a chest CT would not assist in determining whether the pelvic mass was present at that time. I spoke with  Ms. Correia today and reviewed the results. There are some abnormalities that may need follow up, such as the anemia, monoclonal protein, elevated kappa/lambda ratio and chest CT findings, but the next step would be an open biopsy of the pelvic mass with appropriate ancillary studies, including those for a lymphoproliferative process. Dr. Barrow's staff will be contacting Ms. Correia to arrange. We will follow-up as appropriate. Patient acknowledged understanding. (bap)

## 2018-08-22 NOTE — MR AVS SNAPSHOT
"              After Visit Summary   8/22/2018    Lilia Correia    MRN: [id number]           Patient Information     Date Of Birth          1980        Visit Information        Provider Department      8/22/2018 8:00 AM Wisam Smiley MD [facility]        Today's Diagnoses     Soft tissue mass    -  1    Lymphoproliferative disorder (H)           Follow-ups after your visit        Follow-up notes from your care team     Return as necessary.      Who to contact     If you have questions or need follow up information about today's clinic visit or your schedule please contact [facility] directly at [phone].  Normal or non-critical lab and imaging results will be communicated to you by MyChart, letter or phone within 4 business days after the clinic has received the results. If you do not hear from us within 7 days, please contact the clinic through [employer] or phone. If you have a critical or abnormal lab result, we will notify you by phone as soon as possible.  Submit refill requests through [employer] or call your pharmacy and they will forward the refill request to us. Please allow 3 business days for your refill to be completed.          Additional Information About Your Visit        MyChart Information     [employer] gives you secure access to your electronic health record. If you see a primary care provider, you can also send messages to your care team and make appointments. If you have questions, please call your primary care clinic.  If you do not have a primary care provider, please call [phone] and they will assist you.        Care EveryWhere ID     This is your Care EveryWhere ID. This could be used by other organizations to access your [place] medical records  [id number]        Your Vitals Were     Pulse Temperature Respirations Height Last Period Pulse Oximetry    89 97.9  F (36.6  C) (Tympanic) 18 1.83 m (6' 0.05\") 08/02/2018 99%    BMI (Body Mass " Index)                   23.93 kg/m2            Blood Pressure from Last 3 Encounters:   08/22/18 96/68   08/01/18 120/65   08/01/18 111/74    Weight from Last 3 Encounters:   08/22/18 80.2 kg (176 lb 11.2 oz)   08/01/18 79.9 kg (176 lb 3.2 oz)              We Performed the Following     CBC with platelets differential     Comprehensive metabolic panel     EBV DNA PCR Quantitative Whole Blood     Hepatitis B core antibody     Immunoglobulins A G and M     Lactate Dehydrogenase     Protein electrophoresis     Uric acid        Primary Care Provider Office Phone # Fax #    Anika Simmons [phone]       [facility] [place] [address]   [facility] [address]        Equal Access to Services     ANDRES CUNNINGHAM : Hadii aad ku hadasho Soarden, waaxda luqadaha, qaybta kaalmada adeegyada, brandon robison. So [facility] [phone].    ATENCIÓN: Si habla español, tiene a medeiros disposición servicios gratuitos de asistencia lingüística. Llame al [phone].    We comply with applicable federal civil rights laws and [place] laws. We do not discriminate on the basis of race, color, national origin, age, disability, sex, sexual orientation, or gender identity.            Thank you!     Thank you for choosing [facility]  for your care. Our goal is always to provide you with excellent care. Hearing back from our patients is one way we can continue to improve our services. Please take a few minutes to complete the written survey that you may receive in the mail after your visit with us. Thank you!             Your Updated Medication List - Protect others around you: Learn how to safely use, store and throw away your medicines at www.disposemymeds.org.          This list is accurate as of 8/22/18 11:59 PM.  Always use your most recent med list.                   Brand Name Dispense Instructions for use Diagnosis    acetaminophen-codeine 300-30 MG per tablet    TYLENOL #3     Take  30 tablets by mouth as needed        Biotin 92628 MCG Tabs      Take 1 tablet by mouth 2 times daily        chloroquine 250 MG tablet    ARALEN     Take 250 mg by mouth daily        cholecalciferol 5000 units Caps capsule    vitamin D3     Take 5,000 Units by mouth daily        CRANBERRY (VACC OXYCOCCUS) PO      Take 1 capsule by mouth daily        folic acid 400 MCG tablet    FOLVITE     Take 400 mcg by mouth 2 times daily        IRON (FERROUS SULFATE) PO      Take 1 tablet by mouth 2 times daily        levonorgestrel-ethinyl estradiol 0.15-0.03 MG per tablet    SEASONALE          lisinopril 5 MG tablet    PRINIVIL/ZESTRIL     2.5 mg 2 times daily        magnesium 250 MG tablet           methotrexate (Anti-Rheumatic) 2.5 MG Tabs           OMEPRAZOLE PO      Take 20 mg by mouth every morning        predniSONE 5 MG tablet    DELTASONE     Take 5 mg by mouth daily        XARELTO 20 MG Tabs tablet   Generic drug:  rivaroxaban ANTICOAGULANT

## 2018-08-23 LAB
ALBUMIN SERPL ELPH-MCNC: 3.2 G/DL (ref 3.7–5.1)
ALPHA1 GLOB SERPL ELPH-MCNC: 0.3 G/DL (ref 0.2–0.4)
ALPHA2 GLOB SERPL ELPH-MCNC: 1.2 G/DL (ref 0.5–0.9)
B-GLOBULIN SERPL ELPH-MCNC: 0.4 G/DL (ref 0.6–1)
GAMMA GLOB SERPL ELPH-MCNC: 1.4 G/DL (ref 0.7–1.6)
M PROTEIN SERPL ELPH-MCNC: 0.2 G/DL
PROT PATTERN SERPL ELPH-IMP: ABNORMAL

## 2018-08-24 LAB
EBV DNA # SPEC NAA+PROBE: 4435 {COPIES}/ML
EBV DNA SPEC NAA+PROBE-LOG#: 3.6 {LOG_COPIES}/ML

## 2018-08-28 ENCOUNTER — TEAM CONFERENCE (OUTPATIENT)
Dept: ORTHOPEDICS | Facility: CLINIC | Age: 38
End: 2018-08-28

## 2018-08-28 NOTE — TELEPHONE ENCOUNTER
Spoke w/ quintin Desai onco, and he advised an open tissue biopsy of the pelvic mass as all other data available is insufficient to render a definitive diagnosis.  The CT of 7/11/18 does note that a comparison was made to a study of 7/25/13 but no mention of the findings or type of study at that time is noted in the report.      PLAN:  Obtain prior study of 2013, if mass was present and had same appearance, then likelihood of malignancy is very small and biopsy probably isn't needed.  However, if mass was not seen, then we'll proceed with an open biopsy via retroperitoneal route along inner table of iliac crest.      ADDENDUM 15:15  Obtained and rev'd study of 9/2/2013 and only the lung was imaged.  No pelvis imaging.  No lung nodules seen either while present lung CT has small nodules.    We'll plan to do an open biopsy.      MD Ruby Osman Family Professor  Oncology and Adult Reconstructive Surgery  Dept Orthopaedic Surgery, [facility]  [phone] office, [phone] pager  [email or web address]

## 2018-08-29 ENCOUNTER — TEAM CONFERENCE (OUTPATIENT)
Dept: OTHER | Facility: CLINIC | Age: 38
End: 2018-08-29

## 2018-08-29 DIAGNOSIS — R19.00 RETROPERITONEAL MASS: Primary | ICD-10-CM

## 2018-08-29 NOTE — TELEPHONE ENCOUNTER
SURGERY PLAN (PRE-OP PLAN)     Patient Position (indicated by x):     Supine   x  Supine with torso rolled up on a bump      Floppy lateral on torso length bean bag      Lateral decubitus, bean bag, full length      Lateral decubitus, Wixson hip positioner      Safety paddle side supports x 2 clamped to side rail      Lithotomy, both legs in yellow padded leg resendiz      Lithotomy, single leg in yellow padded leg resendiz      Prone on blanket rolls/round gel pad      Prone on Yon (arched) frame on Casper table      Single thigh in orange arthroscopy clamp      Beach chair semi recumbent      Spider limb positioner      Arm out on radiolucent arm table    x  Split drape with top bar      Revision SAROJ drape with plastic side bags for leg     Extremity drape      Shoulder pack drape      Laparotomy drape      Burrows catheter             General Equipment Requests (indicated by x):       C-Arm with C-Armor drape      C-Arm (video capable, Netotiate model)      O-Arm with Stealth imaging   x   Regular OR Table      Casper XR Table      Cell saver      Barrow Biopsy trephine set w/ K-wire & pituitary rongeurs   X  Small pituitary rongeur (Back-up, don't open)      Danial's angled curettes, narrow shaft   x   Vessel loops (Back-up, don't open)     Pituitary Ronguer      Vascular clips   x   2-0 and 3-0 silk ties (Back-up, don't open)      Vancomycin 1 gram powder    x  [place] Retractors   x   Rahul Retractors   x   Core needle Biopsy set; Trucut biopsy      (1) Portable hand held radiation detector machine for sentinel node biopsy and (2) Lymphazurin      [place] Osteotomes      Specimens and cultures (indicated by x):    x  Tissue cultures, aerobic and anaerobic without gram stain   X  Frozen section   X  pathology specimens - fresh      pathology specimens - formalin          Plan:  Open biopsy left pelvis mass    Rogelio Rivera MD  Orthopaedic Surgery, Adult Reconstruction Fellow  Pager [phone]

## 2018-08-30 ENCOUNTER — TRANSFERRED RECORDS (OUTPATIENT)
Dept: HEALTH INFORMATION MANAGEMENT | Facility: CLINIC | Age: 38
End: 2018-08-30

## 2018-08-30 ENCOUNTER — TELEPHONE (OUTPATIENT)
Dept: ORTHOPEDICS | Facility: CLINIC | Age: 38
End: 2018-08-30

## 2018-08-30 NOTE — TELEPHONE ENCOUNTER
Called Lilia to discuss an open biopsy of the left pelvic mass. Explained that this would be an outpatient procedure.  She will a .  Preparation for surgery was discussed.  She has an H&P and labs done last week in [facility].  We will use this as a pre-op physical.  She chose 9/14/18 as a surgery date.

## 2018-09-12 NOTE — OR NURSING
Patient had follow up exam today with PCP for bronchitis.  PCP documents patient should be fine to proceed with procedure on Friday, 09.14.18. The exam is scanned into Media.  This DIANA RN notified Clari Anthony that exam is available in Media for review by Dr. Barrow, and requested a callback to DIANA with Dr. Barrow's decision as to wether to proceed or not.

## 2018-09-13 ENCOUNTER — ANESTHESIA EVENT (OUTPATIENT)
Dept: SURGERY | Facility: CLINIC | Age: 38
End: 2018-09-13
Payer: COMMERCIAL

## 2018-09-13 NOTE — OR NURSING
Received call from Clari Anthony who stated that after checking with Dr.. Barrow, ok to proceed with surgery.

## 2018-09-14 ENCOUNTER — ANESTHESIA (OUTPATIENT)
Dept: SURGERY | Facility: CLINIC | Age: 38
End: 2018-09-14
Payer: COMMERCIAL

## 2018-09-14 ENCOUNTER — HOSPITAL ENCOUNTER (OUTPATIENT)
Facility: CLINIC | Age: 38
Discharge: HOME | End: 2018-09-14
Attending: ORTHOPAEDIC SURGERY | Admitting: ORTHOPAEDIC SURGERY
Payer: COMMERCIAL

## 2018-09-14 ENCOUNTER — APPOINTMENT (OUTPATIENT)
Dept: GENERAL RADIOLOGY | Facility: CLINIC | Age: 38
End: 2018-09-14
Attending: ORTHOPAEDIC SURGERY
Payer: COMMERCIAL

## 2018-09-14 VITALS
WEIGHT: 175.93 LBS | SYSTOLIC BLOOD PRESSURE: 104 MMHG | HEIGHT: 72 IN | HEART RATE: 83 BPM | TEMPERATURE: 97.1 F | OXYGEN SATURATION: 100 % | RESPIRATION RATE: 14 BRPM | DIASTOLIC BLOOD PRESSURE: 59 MMHG | BODY MASS INDEX: 23.83 KG/M2

## 2018-09-14 DIAGNOSIS — R19.00 RETROPERITONEAL MASS: Primary | ICD-10-CM

## 2018-09-14 LAB
ABO + RH BLD: NORMAL
ABO + RH BLD: NORMAL
BLD GP AB SCN SERPL QL: NORMAL
BLOOD BANK CMNT PATIENT-IMP: NORMAL
COPATH REPORT: NORMAL
GLUCOSE SERPL-MCNC: 83 MG/DL (ref 70–99)
HCG UR QL: NEGATIVE
SPECIMEN EXP DATE BLD: NORMAL

## 2018-09-14 PROCEDURE — 25000132 ZZH RX MED GY IP 250 OP 250 PS 637: Performed by: STUDENT IN AN ORGANIZED HEALTH CARE EDUCATION/TRAINING PROGRAM

## 2018-09-14 PROCEDURE — 88185 FLOWCYTOMETRY/TC ADD-ON: CPT | Performed by: ORTHOPAEDIC SURGERY

## 2018-09-14 PROCEDURE — 88307 TISSUE EXAM BY PATHOLOGIST: CPT | Performed by: ORTHOPAEDIC SURGERY

## 2018-09-14 PROCEDURE — 37000008 ZZH ANESTHESIA TECHNICAL FEE, 1ST 30 MIN: Performed by: ORTHOPAEDIC SURGERY

## 2018-09-14 PROCEDURE — 87075 CULTR BACTERIA EXCEPT BLOOD: CPT | Performed by: ORTHOPAEDIC SURGERY

## 2018-09-14 PROCEDURE — 88365 INSITU HYBRIDIZATION (FISH): CPT | Performed by: ORTHOPAEDIC SURGERY

## 2018-09-14 PROCEDURE — 88239 TISSUE CULTURE TUMOR: CPT | Performed by: ORTHOPAEDIC SURGERY

## 2018-09-14 PROCEDURE — 88184 FLOWCYTOMETRY/ TC 1 MARKER: CPT | Performed by: ORTHOPAEDIC SURGERY

## 2018-09-14 PROCEDURE — 86900 BLOOD TYPING SEROLOGIC ABO: CPT | Performed by: ANESTHESIOLOGY

## 2018-09-14 PROCEDURE — 82947 ASSAY GLUCOSE BLOOD QUANT: CPT | Performed by: ANESTHESIOLOGY

## 2018-09-14 PROCEDURE — 71000027 ZZH RECOVERY PHASE 2 EACH 15 MINS: Performed by: ORTHOPAEDIC SURGERY

## 2018-09-14 PROCEDURE — 25000128 H RX IP 250 OP 636: Performed by: STUDENT IN AN ORGANIZED HEALTH CARE EDUCATION/TRAINING PROGRAM

## 2018-09-14 PROCEDURE — 88264 CHROMOSOME ANALYSIS 20-25: CPT | Performed by: ORTHOPAEDIC SURGERY

## 2018-09-14 PROCEDURE — 40001004 ZZHCL STATISTIC FLOW INT 9-15 ABY TC 88188: Performed by: ORTHOPAEDIC SURGERY

## 2018-09-14 PROCEDURE — 87070 CULTURE OTHR SPECIMN AEROBIC: CPT | Performed by: ORTHOPAEDIC SURGERY

## 2018-09-14 PROCEDURE — 40000170 ZZH STATISTIC PRE-PROCEDURE ASSESSMENT II: Performed by: ORTHOPAEDIC SURGERY

## 2018-09-14 PROCEDURE — 27211024 ZZHC OR SUPPLY OTHER OPNP: Performed by: ORTHOPAEDIC SURGERY

## 2018-09-14 PROCEDURE — 25000125 ZZHC RX 250: Performed by: ORTHOPAEDIC SURGERY

## 2018-09-14 PROCEDURE — 40000278 XR SURGERY CARM FLUORO LESS THAN 5 MIN: Mod: TC

## 2018-09-14 PROCEDURE — 25000125 ZZHC RX 250: Performed by: STUDENT IN AN ORGANIZED HEALTH CARE EDUCATION/TRAINING PROGRAM

## 2018-09-14 PROCEDURE — 27210794 ZZH OR GENERAL SUPPLY STERILE: Performed by: ORTHOPAEDIC SURGERY

## 2018-09-14 PROCEDURE — 37000009 ZZH ANESTHESIA TECHNICAL FEE, EACH ADDTL 15 MIN: Performed by: ORTHOPAEDIC SURGERY

## 2018-09-14 PROCEDURE — 25000566 ZZH SEVOFLURANE, EA 15 MIN: Performed by: ORTHOPAEDIC SURGERY

## 2018-09-14 PROCEDURE — P9041 ALBUMIN (HUMAN),5%, 50ML: HCPCS | Performed by: STUDENT IN AN ORGANIZED HEALTH CARE EDUCATION/TRAINING PROGRAM

## 2018-09-14 PROCEDURE — 76377 3D RENDER W/INTRP POSTPROCES: CPT

## 2018-09-14 PROCEDURE — 86850 RBC ANTIBODY SCREEN: CPT | Performed by: ANESTHESIOLOGY

## 2018-09-14 PROCEDURE — 88313 SPECIAL STAINS GROUP 2: CPT | Performed by: ORTHOPAEDIC SURGERY

## 2018-09-14 PROCEDURE — 00000159 ZZHCL STATISTIC H-SEND OUTS PREP: Performed by: ORTHOPAEDIC SURGERY

## 2018-09-14 PROCEDURE — 36000066 ZZH SURGERY LEVEL 4 W FLUORO 1ST 30 MIN - UMMC: Performed by: ORTHOPAEDIC SURGERY

## 2018-09-14 PROCEDURE — 27110028 ZZH OR GENERAL SUPPLY NON-STERILE: Performed by: ORTHOPAEDIC SURGERY

## 2018-09-14 PROCEDURE — 81025 URINE PREGNANCY TEST: CPT | Performed by: ANESTHESIOLOGY

## 2018-09-14 PROCEDURE — 71000014 ZZH RECOVERY PHASE 1 LEVEL 2 FIRST HR: Performed by: ORTHOPAEDIC SURGERY

## 2018-09-14 PROCEDURE — 36000064 ZZH SURGERY LEVEL 4 EA 15 ADDTL MIN - UMMC: Performed by: ORTHOPAEDIC SURGERY

## 2018-09-14 PROCEDURE — 76499 UNLISTED DX RADIOGRAPHIC PX: CPT

## 2018-09-14 PROCEDURE — 87176 TISSUE HOMOGENIZATION CULTR: CPT | Performed by: ORTHOPAEDIC SURGERY

## 2018-09-14 PROCEDURE — 88342 IMHCHEM/IMCYTCHM 1ST ANTB: CPT | Performed by: ORTHOPAEDIC SURGERY

## 2018-09-14 PROCEDURE — 36415 COLL VENOUS BLD VENIPUNCTURE: CPT | Performed by: ANESTHESIOLOGY

## 2018-09-14 PROCEDURE — 88280 CHROMOSOME KARYOTYPE STUDY: CPT | Performed by: ORTHOPAEDIC SURGERY

## 2018-09-14 PROCEDURE — 00000160 ZZHCL STATISTIC H-SPECIAL HANDLING: Performed by: ORTHOPAEDIC SURGERY

## 2018-09-14 PROCEDURE — 25000125 ZZHC RX 250: Performed by: ANESTHESIOLOGY

## 2018-09-14 PROCEDURE — 86901 BLOOD TYPING SEROLOGIC RH(D): CPT | Performed by: ANESTHESIOLOGY

## 2018-09-14 PROCEDURE — 88331 PATH CONSLTJ SURG 1 BLK 1SPC: CPT | Performed by: ORTHOPAEDIC SURGERY

## 2018-09-14 PROCEDURE — 88305 TISSUE EXAM BY PATHOLOGIST: CPT | Performed by: ORTHOPAEDIC SURGERY

## 2018-09-14 PROCEDURE — 88341 IMHCHEM/IMCYTCHM EA ADD ANTB: CPT | Performed by: ORTHOPAEDIC SURGERY

## 2018-09-14 RX ORDER — LABETALOL HYDROCHLORIDE 5 MG/ML
10 INJECTION, SOLUTION INTRAVENOUS
Status: DISCONTINUED | OUTPATIENT
Start: 2018-09-14 | End: 2018-09-14 | Stop reason: HOSPADM

## 2018-09-14 RX ORDER — EPHEDRINE SULFATE 50 MG/ML
INJECTION, SOLUTION INTRAMUSCULAR; INTRAVENOUS; SUBCUTANEOUS PRN
Status: DISCONTINUED | OUTPATIENT
Start: 2018-09-14 | End: 2018-09-14

## 2018-09-14 RX ORDER — ACETAMINOPHEN 325 MG/1
975 TABLET ORAL ONCE
Status: COMPLETED | OUTPATIENT
Start: 2018-09-14 | End: 2018-09-14

## 2018-09-14 RX ORDER — HYDROMORPHONE HYDROCHLORIDE 1 MG/ML
.3-.5 INJECTION, SOLUTION INTRAMUSCULAR; INTRAVENOUS; SUBCUTANEOUS EVERY 10 MIN PRN
Status: DISCONTINUED | OUTPATIENT
Start: 2018-09-14 | End: 2018-09-14 | Stop reason: HOSPADM

## 2018-09-14 RX ORDER — SODIUM CHLORIDE, SODIUM LACTATE, POTASSIUM CHLORIDE, CALCIUM CHLORIDE 600; 310; 30; 20 MG/100ML; MG/100ML; MG/100ML; MG/100ML
INJECTION, SOLUTION INTRAVENOUS CONTINUOUS
Status: DISCONTINUED | OUTPATIENT
Start: 2018-09-14 | End: 2018-09-14 | Stop reason: HOSPADM

## 2018-09-14 RX ORDER — LIDOCAINE HYDROCHLORIDE 20 MG/ML
INJECTION, SOLUTION INFILTRATION; PERINEURAL PRN
Status: DISCONTINUED | OUTPATIENT
Start: 2018-09-14 | End: 2018-09-14

## 2018-09-14 RX ORDER — FENTANYL CITRATE 50 UG/ML
25-50 INJECTION, SOLUTION INTRAMUSCULAR; INTRAVENOUS
Status: DISCONTINUED | OUTPATIENT
Start: 2018-09-14 | End: 2018-09-14 | Stop reason: HOSPADM

## 2018-09-14 RX ORDER — ONDANSETRON 4 MG/1
4 TABLET, ORALLY DISINTEGRATING ORAL EVERY 30 MIN PRN
Status: DISCONTINUED | OUTPATIENT
Start: 2018-09-14 | End: 2018-09-14 | Stop reason: HOSPADM

## 2018-09-14 RX ORDER — ALBUTEROL SULFATE 0.83 MG/ML
2.5 SOLUTION RESPIRATORY (INHALATION) ONCE
Status: COMPLETED | OUTPATIENT
Start: 2018-09-14 | End: 2018-09-14

## 2018-09-14 RX ORDER — LIDOCAINE 40 MG/G
CREAM TOPICAL
Status: DISCONTINUED | OUTPATIENT
Start: 2018-09-14 | End: 2018-09-14 | Stop reason: HOSPADM

## 2018-09-14 RX ORDER — PROPOFOL 10 MG/ML
INJECTION, EMULSION INTRAVENOUS PRN
Status: DISCONTINUED | OUTPATIENT
Start: 2018-09-14 | End: 2018-09-14

## 2018-09-14 RX ORDER — NALOXONE HYDROCHLORIDE 0.4 MG/ML
.1-.4 INJECTION, SOLUTION INTRAMUSCULAR; INTRAVENOUS; SUBCUTANEOUS
Status: DISCONTINUED | OUTPATIENT
Start: 2018-09-14 | End: 2018-09-14 | Stop reason: HOSPADM

## 2018-09-14 RX ORDER — SODIUM CHLORIDE, SODIUM LACTATE, POTASSIUM CHLORIDE, CALCIUM CHLORIDE 600; 310; 30; 20 MG/100ML; MG/100ML; MG/100ML; MG/100ML
INJECTION, SOLUTION INTRAVENOUS CONTINUOUS PRN
Status: DISCONTINUED | OUTPATIENT
Start: 2018-09-14 | End: 2018-09-14

## 2018-09-14 RX ORDER — FENTANYL CITRATE 50 UG/ML
INJECTION, SOLUTION INTRAMUSCULAR; INTRAVENOUS PRN
Status: DISCONTINUED | OUTPATIENT
Start: 2018-09-14 | End: 2018-09-14

## 2018-09-14 RX ORDER — CEFAZOLIN SODIUM 1 G/3ML
INJECTION, POWDER, FOR SOLUTION INTRAMUSCULAR; INTRAVENOUS PRN
Status: DISCONTINUED | OUTPATIENT
Start: 2018-09-14 | End: 2018-09-14

## 2018-09-14 RX ORDER — MAGNESIUM HYDROXIDE 1200 MG/15ML
LIQUID ORAL PRN
Status: DISCONTINUED | OUTPATIENT
Start: 2018-09-14 | End: 2018-09-14 | Stop reason: HOSPADM

## 2018-09-14 RX ORDER — ONDANSETRON 2 MG/ML
INJECTION INTRAMUSCULAR; INTRAVENOUS PRN
Status: DISCONTINUED | OUTPATIENT
Start: 2018-09-14 | End: 2018-09-14

## 2018-09-14 RX ORDER — ONDANSETRON 2 MG/ML
4 INJECTION INTRAMUSCULAR; INTRAVENOUS EVERY 30 MIN PRN
Status: DISCONTINUED | OUTPATIENT
Start: 2018-09-14 | End: 2018-09-14 | Stop reason: HOSPADM

## 2018-09-14 RX ORDER — HYDROCODONE BITARTRATE AND ACETAMINOPHEN 5; 325 MG/1; MG/1
1 TABLET ORAL EVERY 4 HOURS PRN
Qty: 30 TABLET | Refills: 0 | Status: SHIPPED | OUTPATIENT
Start: 2018-09-14

## 2018-09-14 RX ORDER — MEPERIDINE HYDROCHLORIDE 25 MG/ML
12.5 INJECTION INTRAMUSCULAR; INTRAVENOUS; SUBCUTANEOUS
Status: DISCONTINUED | OUTPATIENT
Start: 2018-09-14 | End: 2018-09-14 | Stop reason: HOSPADM

## 2018-09-14 RX ORDER — BUPIVACAINE HYDROCHLORIDE AND EPINEPHRINE 2.5; 5 MG/ML; UG/ML
INJECTION, SOLUTION EPIDURAL; INFILTRATION; INTRACAUDAL; PERINEURAL PRN
Status: DISCONTINUED | OUTPATIENT
Start: 2018-09-14 | End: 2018-09-14 | Stop reason: HOSPADM

## 2018-09-14 RX ORDER — GABAPENTIN 300 MG/1
300 CAPSULE ORAL ONCE
Status: COMPLETED | OUTPATIENT
Start: 2018-09-14 | End: 2018-09-14

## 2018-09-14 RX ORDER — CLINDAMYCIN PHOSPHATE 900 MG/50ML
INJECTION, SOLUTION INTRAVENOUS PRN
Status: DISCONTINUED | OUTPATIENT
Start: 2018-09-14 | End: 2018-09-14

## 2018-09-14 RX ORDER — ALBUMIN, HUMAN INJ 5% 5 %
SOLUTION INTRAVENOUS CONTINUOUS PRN
Status: DISCONTINUED | OUTPATIENT
Start: 2018-09-14 | End: 2018-09-14

## 2018-09-14 RX ADMIN — LIDOCAINE HYDROCHLORIDE 60 MG: 20 INJECTION, SOLUTION INFILTRATION; PERINEURAL at 07:56

## 2018-09-14 RX ADMIN — CLINDAMYCIN PHOSPHATE 900 MG: 18 INJECTION, SOLUTION INTRAVENOUS at 10:15

## 2018-09-14 RX ADMIN — ONDANSETRON 4 MG: 2 INJECTION INTRAMUSCULAR; INTRAVENOUS at 10:56

## 2018-09-14 RX ADMIN — ROCURONIUM BROMIDE 20 MG: 10 INJECTION INTRAVENOUS at 09:07

## 2018-09-14 RX ADMIN — ROCURONIUM BROMIDE 30 MG: 10 INJECTION INTRAVENOUS at 08:29

## 2018-09-14 RX ADMIN — PHENYLEPHRINE HYDROCHLORIDE 50 MCG: 10 INJECTION, SOLUTION INTRAMUSCULAR; INTRAVENOUS; SUBCUTANEOUS at 09:45

## 2018-09-14 RX ADMIN — ALBUTEROL SULFATE 2.5 MG: 2.5 SOLUTION RESPIRATORY (INHALATION) at 11:51

## 2018-09-14 RX ADMIN — PHENYLEPHRINE HYDROCHLORIDE 100 MCG: 10 INJECTION, SOLUTION INTRAMUSCULAR; INTRAVENOUS; SUBCUTANEOUS at 08:19

## 2018-09-14 RX ADMIN — PHENYLEPHRINE HYDROCHLORIDE 50 MCG: 10 INJECTION, SOLUTION INTRAMUSCULAR; INTRAVENOUS; SUBCUTANEOUS at 08:35

## 2018-09-14 RX ADMIN — ACETAMINOPHEN 975 MG: 325 TABLET, FILM COATED ORAL at 06:26

## 2018-09-14 RX ADMIN — PHENYLEPHRINE HYDROCHLORIDE 100 MCG: 10 INJECTION, SOLUTION INTRAMUSCULAR; INTRAVENOUS; SUBCUTANEOUS at 10:00

## 2018-09-14 RX ADMIN — FENTANYL CITRATE 25 MCG: 50 INJECTION, SOLUTION INTRAMUSCULAR; INTRAVENOUS at 11:00

## 2018-09-14 RX ADMIN — FENTANYL CITRATE 50 MCG: 50 INJECTION, SOLUTION INTRAMUSCULAR; INTRAVENOUS at 10:58

## 2018-09-14 RX ADMIN — HYDROCORTISONE SODIUM SUCCINATE 100 MG: 100 INJECTION, POWDER, FOR SOLUTION INTRAMUSCULAR; INTRAVENOUS at 07:59

## 2018-09-14 RX ADMIN — FENTANYL CITRATE 100 MCG: 50 INJECTION, SOLUTION INTRAMUSCULAR; INTRAVENOUS at 07:56

## 2018-09-14 RX ADMIN — PHENYLEPHRINE HYDROCHLORIDE 100 MCG: 10 INJECTION, SOLUTION INTRAMUSCULAR; INTRAVENOUS; SUBCUTANEOUS at 10:13

## 2018-09-14 RX ADMIN — PROPOFOL 200 MG: 10 INJECTION, EMULSION INTRAVENOUS at 07:56

## 2018-09-14 RX ADMIN — Medication 5 MG: at 08:10

## 2018-09-14 RX ADMIN — PHENYLEPHRINE HYDROCHLORIDE 50 MCG: 10 INJECTION, SOLUTION INTRAMUSCULAR; INTRAVENOUS; SUBCUTANEOUS at 08:01

## 2018-09-14 RX ADMIN — Medication 5 MG: at 10:35

## 2018-09-14 RX ADMIN — ALBUMIN HUMAN: 0.05 INJECTION, SOLUTION INTRAVENOUS at 08:42

## 2018-09-14 RX ADMIN — SODIUM CHLORIDE, POTASSIUM CHLORIDE, SODIUM LACTATE AND CALCIUM CHLORIDE: 600; 310; 30; 20 INJECTION, SOLUTION INTRAVENOUS at 07:48

## 2018-09-14 RX ADMIN — PHENYLEPHRINE HYDROCHLORIDE 50 MCG: 10 INJECTION, SOLUTION INTRAMUSCULAR; INTRAVENOUS; SUBCUTANEOUS at 09:09

## 2018-09-14 RX ADMIN — Medication 5 MG: at 08:20

## 2018-09-14 RX ADMIN — FENTANYL CITRATE 25 MCG: 50 INJECTION, SOLUTION INTRAMUSCULAR; INTRAVENOUS at 10:07

## 2018-09-14 RX ADMIN — GABAPENTIN 300 MG: 300 CAPSULE ORAL at 06:26

## 2018-09-14 RX ADMIN — ROCURONIUM BROMIDE 10 MG: 10 INJECTION INTRAVENOUS at 09:42

## 2018-09-14 RX ADMIN — Medication 1 TABLET: at 13:08

## 2018-09-14 RX ADMIN — SUGAMMADEX 160 MG: 100 INJECTION, SOLUTION INTRAVENOUS at 10:52

## 2018-09-14 RX ADMIN — Medication 5 MG: at 10:01

## 2018-09-14 RX ADMIN — Medication 5 MG: at 09:49

## 2018-09-14 RX ADMIN — PHENYLEPHRINE HYDROCHLORIDE 100 MCG: 10 INJECTION, SOLUTION INTRAMUSCULAR; INTRAVENOUS; SUBCUTANEOUS at 10:36

## 2018-09-14 RX ADMIN — PHENYLEPHRINE HYDROCHLORIDE 100 MCG: 10 INJECTION, SOLUTION INTRAMUSCULAR; INTRAVENOUS; SUBCUTANEOUS at 09:56

## 2018-09-14 RX ADMIN — HYDROMORPHONE HYDROCHLORIDE 0.2 MG: 1 INJECTION, SOLUTION INTRAMUSCULAR; INTRAVENOUS; SUBCUTANEOUS at 09:30

## 2018-09-14 RX ADMIN — MIDAZOLAM 2 MG: 1 INJECTION INTRAMUSCULAR; INTRAVENOUS at 07:56

## 2018-09-14 NOTE — ANESTHESIA PREPROCEDURE EVALUATION
Anesthesia Evaluation     . Pt has had prior anesthetic.            ROS/MED HX    ENT/Pulmonary:     (+)AUBREY risk factors hypertension, , recent URI unresolved . .    Neurologic:  - neg neurologic ROS     Cardiovascular:     (+) hypertension----. Taking blood thinners : . CHF etiology: non ischemic cardiomyopathy Last EF: 65 date: 2014 . . :. . Previous cardiac testing date:results:date: results:ECG reviewed date: results:NSR date: results:          METS/Exercise Tolerance:     Hematologic:     (+) Anemia (Last Hgb = 9.7), Other Hematologic Disorder-Lupus on immunosuppressants , CMV      Musculoskeletal:  - neg musculoskeletal ROS       GI/Hepatic:     (+) GERD       Renal/Genitourinary:     (+) chronic renal disease,       Endo:     (+) Chronic steroid usage for Other .      Psychiatric:         Infectious Disease:   (+) Other Infectious Disease Recent Bronchitis, on steroids, finished a taper prior to surgery      Malignancy:      - no malignancy   Other:    (+) C-spine cleared: N/A, no H/O Chronic Pain,no other significant disability                Labs:  BMP:  Recent Labs   Lab Test  09/14/18   0611  08/22/18   1018   NA   --   137   POTASSIUM   --   3.9   CHLORIDE   --   108   CO2   --   22   BUN   --   20   CR   --   0.92   GLC  83  86   CARLOS   --   7.8*     LFTs:   Recent Labs   Lab Test  08/22/18   1018   PROTTOTAL  6.9   ALBUMIN  3.1*   BILITOTAL  0.4   ALKPHOS  61   AST  19   ALT  17     CBC:   Recent Labs   Lab Test  08/22/18   1018   WBC  3.6*   RBC  2.93*   HGB  9.7*   HCT  29.4*   MCV  100   MCH  33.1*   MCHC  33.0   RDW  14.4   PLT  118*     Coags:  No results for input(s): INR, PTT, FIBR in the last 36527 hours.    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   Worsening cytopenia, kidney injury, rash 9/2013.      Tape  [Adhesive Tape] Itching     Sulfa Drugs Unknown and Other (See Comments)     Kidney failure           Meds:   Prescriptions Prior to Admission   Medication Sig Dispense Refill Last Dose     acetaminophen-codeine (TYLENOL #3) 300-30 MG per tablet Take 30 tablets by mouth as needed   Past Week at Unknown time     Biotin 08987 MCG TABS Take 1 tablet by mouth 2 times daily    9/13/2018 at 2000     chloroquine (ARALEN) 250 MG tablet Take 250 mg by mouth daily   9/13/2018 at 0615     cholecalciferol (VITAMIN D3) 5000 units CAPS capsule Take 5,000 Units by mouth daily   9/13/2018 at 0615     CRANBERRY, VACC OXYCOCCUS, PO Take 1 capsule by mouth daily    9/13/2018 at 0615     folic acid (FOLVITE) 400 MCG tablet Take 400 mcg by mouth 2 times daily    9/13/2018 at 2000     IRON, FERROUS SULFATE, PO Take 1 tablet by mouth 2 times daily    9/13/2018 at 2000     levonorgestrel-ethinyl estradiol (SEASONALE) 0.15-0.03 MG per tablet    9/13/2018 at 2000     lisinopril (PRINIVIL/ZESTRIL) 5 MG tablet 2.5 mg 2 times daily    9/13/2018 at 0615     magnesium 250 MG tablet    9/13/2018 at 2000     methotrexate, Anti-Rheumatic, 2.5 MG TABS    Past  Week at Unknown time     OMEPRAZOLE PO Take 20 mg by mouth every morning   9/13/2018 at 0615     predniSONE (DELTASONE) 5 MG tablet Take 5 mg by mouth daily    Past Month at Unknown time     PREDNISONE PO Take 20 mg by mouth   9/13/2018 at 0615     rivaroxaban ANTICOAGULANT (XARELTO) 20 MG TABS tablet    9/9/2018 at 0615       No current outpatient prescriptions on file.                        Anesthesia Plan      History & Physical Review  History and physical reviewed and following examination; no interval change.    ASA Status:  3 .    NPO Status:  > 8 hours    Plan for General and ETT with Intravenous induction. Maintenance will be Inhalation.    PONV prophylaxis:  Dexamethasone or Solumedrol and Ondansetron (or other 5HT-3)  Additional equipment: 2nd IV and Arterial Line Anesthetic plan: Since the patient is on blood thinner, regional is contraindicated. GETA with possible arterial line      Postoperative Care  Postoperative pain management:  IV analgesics, Oral pain medications and Multi-modal analgesia.      Consents  Anesthetic plan, risks, benefits and alternatives discussed with:  Patient..                          .

## 2018-09-14 NOTE — OR NURSING
Pt maintaining sats 94% and above on room air.  As she awoke more in PACU coughing began.  LS course.  Pt recovering from recent UTI and has been on home steroids as well as azithromycin.  MD ordered albuteral neb.      Dr Kathy SINGH with pt going to Phase II after neb complete.

## 2018-09-14 NOTE — ANESTHESIA CARE TRANSFER NOTE
Patient: Lilia Correia    Procedure(s):  Left Pelvic Mass  - Wound Class: I-Clean    Diagnosis: Left Pelvic/Retroperitoneal Mass   Diagnosis Additional Information: No value filed.    Anesthesia Type:   General     Note:  Airway :Face Mask  Patient transferred to:PACU  Comments: Patient awake, following commands, VSS. Given her eye glasses back to her. She is not endorsing any pain. Signed out care to PACU RN.  is staffHandoff Report: Identifed the Patient, Identified the Reponsible Provider, Reviewed the pertinent medical history, Discussed the surgical course, Reviewed Intra-OP anesthesia mangement and issues during anesthesia, Set expectations for post-procedure period and Allowed opportunity for questions and acknowledgement of understanding      Vitals: (Last set prior to Anesthesia Care Transfer)    CRNA VITALS  9/14/2018 1042 - 9/14/2018 1122      9/14/2018             Resp Rate (observed): (!)  1                Electronically Signed By: Raz King MD  September 14, 2018  11:22 AM

## 2018-09-14 NOTE — IP AVS SNAPSHOT
MRN:[id number]                      After Visit Summary   9/14/2018    Lilia Correia    MRN: [id number]           Thank you!     Thank you for choosing [facility] for your care. Our goal is always to provide you with excellent care. Hearing back from our patients is one way we can continue to improve our services. Please take a few minutes to complete the written survey that you may receive in the mail after you visit with us. Thank you!        Patient Information     Date Of Birth          1980        About your hospital stay     You were admitted on:  September 14, 2018 You last received care in the:   PACU    You were discharged on:  September 14, 2018       Who to Call     For medical emergencies, please call 911.  For non-urgent questions about your medical care, please call your primary care provider or clinic, [phone]  For questions related to your surgery, please call your surgery clinic        Attending Provider     Provider Specialty    Andrey Barrow MD Orthopedics       Primary Care Provider Office Phone # Fax #    Anika Simmons [phone]      After Care Instructions     Discharge Instructions       SAME DAY SURGERY DISCHARGE INSTRUCTIONS:    Discharge disposition: Discharge to home     Diet: Advance to a regular diet as tolerated     Activity: Activity as tolerated     Follow-up: Please call [phone], option #1, during weekday business hours 8 am - 4:30 pm. Please schedule your follow-up appointment 2 weeks after surgery.    For 24 hours after surgery:  1. Get plenty of rest. A responsible adult must stay with you for at least 24 hours after you leave the hospital.  2. Avoid strenuous or risky activities. Ask for help when climbing stairs.  3. You may feel lightheaded. If so, sit for a few minutes before standing. Have someone help you get up.   4. If you have nausea (feel sick to your stomach), drink only clear liquids such as apple juice, sheldon  néstor, broth, or 7-UP. Rest may also help. Be sure to drink enough fluids. Move to a regular diet as you feel able.   5. You may have a slight fever. Call your doctor if your fever is over 100.4 F (38 C) (taken under the tongue) or lasts longer than 24 hours.   6. You may have a dry mouth, sore throat, muscle aches, or trouble sleeping. These should go away after 24 hours.     Contact your provider immediately at [phone] for any of the following concerns:   1. Signs of infection such as fever (temperature >100.4 F or 38 C), growing tenderness at the surgery site, a large amount of drainage or bleeding, severe pain, foul-smelling drainage, redness, and/or swelling.  2. If it has been over 8-10 hours since surgery, and you still have not urinated (or passsed water).   3. Headache for over 24 hours.   4. Numbness, tingling, or weakness the day after surgery (if you had spinal anesthesia).     If you have any other concerns during normal business hours, please contact Dr. Barrow's nurse, Clari Perez RN, at [phone] during normal business hours 8 am - 5 pm (leave message as needed). If your concern is urgent, please page Clari Perez RN at [phone] and key in your 10 digit phone number followed by the # sign after the beep. Alternatively, you may contact our nurse triage coordinators at [phone], option 3.    All after business hours concerns can be addressed by the orthopaedic resident on call using the hospital  at [phone], option 4.    If you have an emergent concern, contact the Emergency Department at the [place] - [phone] - or [place] - [phone] - [facility].            Dressing care       Remove dressing in 5-7 days; steri-strips will fall off in 7-10 days.  Ok to shower and rinse over dressing in 2 days; avoid soaking in bath tubs and pools.    If the incision is leaking any fluid or blood through or around the bandage, remove the existing bandage and replace it  with gauze and tape. Be sure to wash your hands before and after, and use gloves if available.    Sutures (stitches) will dissolve, however, the exposed suture (looks like a clear fishing line) at each end of the incision should be clipped flush with the skin using clean scissors or a nail clipper anytime after the third day after surgery. If desired, this can be done by clinic personnel at your follow up office visit instead.            ICE to operative site       Ice pack to surgical site every 15-30 minutes per hour for 24 hours as desired to minimize swelling and pain.            Weight bearing status - as tolerated                 Further instructions from your care team       Procedure: Open biopsy left pelvis mass    Activity: WBAT, limit activity until seen at follow-up visit    No strenuous activity    Remove dressing in 5-7 days  Steri-strips will fall off in 7-10 days    Ok to begin showering in 2 days; lightly rinse over dressing then dry  Avoid soaking surgical site in bath tubs and pools    Follow-up with Dr. Barrow in 2 weeks    Rogelio Rivera MD  Orthopaedic Surgery, Adult Reconstruction Fellow  Pager [phone]      Same-Day Surgery   Adult Discharge Orders & Instructions     For 24 hours after surgery:  1. Get plenty of rest.  A responsible adult must stay with you for at least 24 hours after you leave the hospital.   2. Pain medication can slow your reflexes. Do not drive or use heavy equipment.  If you have weakness or tingling, don't drive or use heavy equipment until this feeling goes away.  3. Mixing alcohol and pain medication can cause dizziness and slow your breathing. It can even be fatal. Do not drink alcohol while taking pain medication.  4. Avoid strenuous or risky activities.  Ask for help when climbing stairs.   5. You may feel lightheaded.  If so, sit for a few minutes before standing.  Have someone help you get up.   6. If you have nausea (feel sick to your stomach), drink only clear  liquids such as apple juice, ginger ale, broth or 7-Up.  Rest may also help.  Be sure to drink enough fluids.  Move to a regular diet as you feel able. Take pain medications with a small amount of solid food, such as toast or crackers, to avoid nausea.   7. A slight fever is normal. Call the doctor if your fever is over 100 F (37.7 C) (taken under the tongue) or lasts longer than 24 hours.  8. You may have a dry mouth, muscle aches, trouble sleeping or a sore throat.  These symptoms should go away after 24 hours.  9. Do not make important or legal decisions.   Pain Management:      1. Take pain medication (if prescribed) for pain as directed by your physician.        2. WARNING: If the pain medication you have been prescribed contains Tylenol  (acetaminophen), DO NOT take additional doses of Tylenol (acetaminophen).     Call your doctor for any of the followin.  Signs of infection (fever, growing tenderness at the surgery site, severe pain, a large amount of drainage or bleeding, foul-smelling drainage, redness, swelling).    2.  It has been over 8 to 10 hours since surgery and you are still not able to urinate (pee).    3.  Headache for over 24 hours.    4.  Numbness, tingling or weakness the day after surgery (if you had spinal anesthesia).  To contact a doctor, call _____________________________________ or:      [phone] and ask for the Resident On Call for:          __________________________________________ (answered 24 hours a day)      Emergency Department:  [facility]: [phone]  [facility]: [phone]               Rev. 10/2014       Additional Information     If you use hormonal birth control (such as the pill, patch, ring or implants): You'll need a second form of birth control for 7 days (condoms, a diaphragm or contraceptive foam). While in the hospital, you received a medicine called Bridion. Your normal birth control will not work as well for a  "week after taking this medicine.          Pending Results     Date and Time Order Name Status Description    9/14/2018 1055 Leukemia Lymphoma Evaluation (Flow Cytometry) In process     9/14/2018 1013 Tissue Culture Aerobic Bacterial In process     9/14/2018 1013 Anaerobic bacterial culture In process     9/14/2018 1004 Surgical pathology exam Preliminary             Admission Information     Date & Time Provider Department Dept. Phone    9/14/2018 Andrey Barrow MD [facility] PACU [phone]      Your Vitals Were     Blood Pressure Pulse Temperature Respirations Height Weight    113/71 83 97.7  F (36.5  C) (Oral) 25 1.83 m (6' 0.05\") 79.8 kg (175 lb 14.8 oz)    Last Period Pulse Oximetry BMI (Body Mass Index)             08/02/2018 (Exact Date) 100% 23.83 kg/m2         MyChart Information     [facility] gives you secure access to your electronic health record. If you see a primary care provider, you can also send messages to your care team and make appointments. If you have questions, please call your primary care clinic.  If you do not have a primary care provider, please call [phone] and they will assist you.        Care EveryWhere ID     This is your Care EveryWhere ID. This could be used by other organizations to access your [facility] medical records  [id number]        Equal Access to Services     ANDRES CUNNINGHAM : Ciera Neil, waaxda luqadaha, qaybta kaalmada adeopalyada, brandon robison. So [facility] [phone].    ATENCIÓN: Si habla español, tiene a medeiros disposición servicios gratuitos de asistencia lingüística. Llame al [phone].    We comply with applicable federal civil rights laws and [place] laws. We do not discriminate on the basis of race, color, national origin, age, disability, sex, sexual orientation, or gender identity.               Review of your medicines      START taking        Dose / Directions    HYDROcodone-acetaminophen 5-325 MG per tablet   Commonly " known as:  NORCO   Used for:  Retroperitoneal mass        Dose:  1 tablet   Take 1 tablet by mouth every 4 hours as needed for pain   Quantity:  30 tablet   Refills:  0         CONTINUE these medicines which have NOT CHANGED        Dose / Directions    acetaminophen-codeine 300-30 MG per tablet   Commonly known as:  TYLENOL #3        Dose:  30 tablet   Take 30 tablets by mouth as needed   Refills:  0       Biotin 82352 MCG Tabs        Dose:  1 tablet   Take 1 tablet by mouth 2 times daily   Refills:  0       chloroquine 250 MG tablet   Commonly known as:  ARALEN        Dose:  250 mg   Take 250 mg by mouth daily   Refills:  0       cholecalciferol 5000 units Caps capsule   Commonly known as:  vitamin D3        Dose:  5000 Units   Take 5,000 Units by mouth daily   Refills:  0       CRANBERRY (VACC OXYCOCCUS) PO        Dose:  1 capsule   Take 1 capsule by mouth daily   Refills:  0       folic acid 400 MCG tablet   Commonly known as:  FOLVITE        Dose:  400 mcg   Take 400 mcg by mouth 2 times daily   Refills:  0       IRON (FERROUS SULFATE) PO        Dose:  1 tablet   Take 1 tablet by mouth 2 times daily   Refills:  0       levonorgestrel-ethinyl estradiol 0.15-0.03 MG per tablet   Commonly known as:  SEASONALE        Refills:  0       lisinopril 5 MG tablet   Commonly known as:  PRINIVIL/ZESTRIL        Dose:  2.5 mg   2.5 mg 2 times daily   Refills:  0       magnesium 250 MG tablet        Refills:  0       methotrexate (Anti-Rheumatic) 2.5 MG Tabs        Refills:  0       OMEPRAZOLE PO        Dose:  20 mg   Take 20 mg by mouth every morning   Refills:  0       * PREDNISONE PO        Dose:  20 mg   Take 20 mg by mouth   Refills:  0       * predniSONE 5 MG tablet   Commonly known as:  DELTASONE        Dose:  5 mg   Take 5 mg by mouth daily   Refills:  0       XARELTO 20 MG Tabs tablet   Generic drug:  rivaroxaban ANTICOAGULANT        Refills:  0       * Notice:  This list has 2 medication(s) that are the same as other  medications prescribed for you. Read the directions carefully, and ask your doctor or other care provider to review them with you.         Where to get your medicines      Some of these will need a paper prescription and others can be bought over the counter. Ask your nurse if you have questions.     Bring a paper prescription for each of these medications     HYDROcodone-acetaminophen 5-325 MG per tablet                Protect others around you: Learn how to safely use, store and throw away your medicines at www.disposemymeds.org.        Information about OPIOIDS     PRESCRIPTION OPIOIDS: WHAT YOU NEED TO KNOW   We gave you an opioid (narcotic) pain medicine. It is important to manage your pain, but opioids are not always the best choice. You should first try all the other options your care team gave you. Take this medicine for as short a time (and as few doses) as possible.    Some activities can increase your pain, such as bandage changes or therapy sessions. It may help to take your pain medicine 30 to 60 minutes before these activities. Reduce your stress by getting enough sleep, working on hobbies you enjoy and practicing relaxation or meditation. Talk to your care team about ways to manage your pain beyond prescription opioids.    These medicines have risks:    DO NOT drive when on new or higher doses of pain medicine. These medicines can affect your alertness and reaction times, and you could be arrested for driving under the influence (DUI). If you need to use opioids long-term, talk to your care team about driving.    DO NOT operate heavy machinery    DO NOT do any other dangerous activities while taking these medicines.    DO NOT drink any alcohol while taking these medicines.     If the opioid prescribed includes acetaminophen, DO NOT take with any other medicines that contain acetaminophen. Read all labels carefully. Look for the word  acetaminophen  or  Tylenol.  Ask your pharmacist if you have  questions or are unsure.    You can get addicted to pain medicines, especially if you have a history of addiction (chemical, alcohol or substance dependence). Talk to your care team about ways to reduce this risk.    All opioids tend to cause constipation. Drink plenty of water and eat foods that have a lot of fiber, such as fruits, vegetables, prune juice, apple juice and high-fiber cereal. Take a laxative (Miralax, milk of magnesia, Colace, Senna) if you don t move your bowels at least every other day. Other side effects include upset stomach, sleepiness, dizziness, throwing up, tolerance (needing more of the medicine to have the same effect), physical dependence and slowed breathing.    Store your pills in a secure place, locked if possible. We will not replace any lost or stolen medicine. If you don t finish your medicine, please throw away (dispose) as directed by your pharmacist. The [place] Pollution [employer] has more information about safe disposal: https://www.pca.[facility].[place].us/living-green/managing-unwanted-medications             Medication List: This is a list of all your medications and when to take them. Check marks below indicate your daily home schedule. Keep this list as a reference.      Medications           Morning Afternoon Evening Bedtime As Needed    acetaminophen-codeine 300-30 MG per tablet   Commonly known as:  TYLENOL #3   Take 30 tablets by mouth as needed                                Biotin 00634 MCG Tabs   Take 1 tablet by mouth 2 times daily                                chloroquine 250 MG tablet   Commonly known as:  ARALEN   Take 250 mg by mouth daily                                cholecalciferol 5000 units Caps capsule   Commonly known as:  vitamin D3   Take 5,000 Units by mouth daily                                CRANBERRY (VACC OXYCOCCUS) PO   Take 1 capsule by mouth daily                                folic acid 400 MCG tablet   Commonly known as:  FOLVITE   Take 400  mcg by mouth 2 times daily                                HYDROcodone-acetaminophen 5-325 MG per tablet   Commonly known as:  NORCO   Take 1 tablet by mouth every 4 hours as needed for pain                                IRON (FERROUS SULFATE) PO   Take 1 tablet by mouth 2 times daily                                levonorgestrel-ethinyl estradiol 0.15-0.03 MG per tablet   Commonly known as:  SEASONALE                                lisinopril 5 MG tablet   Commonly known as:  PRINIVIL/ZESTRIL   2.5 mg 2 times daily                                magnesium 250 MG tablet                                methotrexate (Anti-Rheumatic) 2.5 MG Tabs                                OMEPRAZOLE PO   Take 20 mg by mouth every morning                                * PREDNISONE PO   Take 20 mg by mouth                                * predniSONE 5 MG tablet   Commonly known as:  DELTASONE   Take 5 mg by mouth daily                                XARELTO 20 MG Tabs tablet   Generic drug:  rivaroxaban ANTICOAGULANT                                * Notice:  This list has 2 medication(s) that are the same as other medications prescribed for you. Read the directions carefully, and ask your doctor or other care provider to review them with you.

## 2018-09-14 NOTE — IP AVS SNAPSHOT
[facility]    [address] [address]    Phone:  [phone]                                       After Visit Summary   9/14/2018    Lilia Correia    MRN: [id number]           After Visit Summary Signature Page     I have received my discharge instructions, and my questions have been answered. I have discussed any challenges I see with this plan with the nurse or doctor.    ..........................................................................................................................................  Patient/Patient Representative Signature      ..........................................................................................................................................  Patient Representative Print Name and Relationship to Patient    ..................................................               ................................................  Date                                   Time    ..........................................................................................................................................  Reviewed by Signature/Title    ...................................................              ..............................................  Date                                               Time          22EPIC Rev 08/18

## 2018-09-14 NOTE — DISCHARGE INSTRUCTIONS
Procedure: Open biopsy left pelvis mass    Activity: WBAT, limit activity until seen at follow-up visit    No strenuous activity    Remove dressing in 5-7 days  Steri-strips will fall off in 7-10 days    Ok to begin showering in 2 days; lightly rinse over dressing then dry  Avoid soaking surgical site in bath tubs and pools    Follow-up with Dr. Barrow in 2 weeks    Rogelio Rivera MD  Orthopaedic Surgery, Adult Reconstruction Fellow  Pager [phone]      Same-Day Surgery   Adult Discharge Orders & Instructions     For 24 hours after surgery:  1. Get plenty of rest.  A responsible adult must stay with you for at least 24 hours after you leave the hospital.   2. Pain medication can slow your reflexes. Do not drive or use heavy equipment.  If you have weakness or tingling, don't drive or use heavy equipment until this feeling goes away.  3. Mixing alcohol and pain medication can cause dizziness and slow your breathing. It can even be fatal. Do not drink alcohol while taking pain medication.  4. Avoid strenuous or risky activities.  Ask for help when climbing stairs.   5. You may feel lightheaded.  If so, sit for a few minutes before standing.  Have someone help you get up.   6. If you have nausea (feel sick to your stomach), drink only clear liquids such as apple juice, ginger ale, broth or 7-Up.  Rest may also help.  Be sure to drink enough fluids.  Move to a regular diet as you feel able. Take pain medications with a small amount of solid food, such as toast or crackers, to avoid nausea.   7. A slight fever is normal. Call the doctor if your fever is over 100 F (37.7 C) (taken under the tongue) or lasts longer than 24 hours.  8. You may have a dry mouth, muscle aches, trouble sleeping or a sore throat.  These symptoms should go away after 24 hours.  9. Do not make important or legal decisions.   Pain Management:      1. Take pain medication (if prescribed) for pain as directed by your physician.        2. WARNING: If  the pain medication you have been prescribed contains Tylenol  (acetaminophen), DO NOT take additional doses of Tylenol (acetaminophen).     Call your doctor for any of the followin.  Signs of infection (fever, growing tenderness at the surgery site, severe pain, a large amount of drainage or bleeding, foul-smelling drainage, redness, swelling).    2.  It has been over 8 to 10 hours since surgery and you are still not able to urinate (pee).    3.  Headache for over 24 hours.    4.  Numbness, tingling or weakness the day after surgery (if you had spinal anesthesia).  To contact a doctor, call _____________________________________ or:      [phone] and ask for the Resident On Call for:          __________________________________________ (answered 24 hours a day)      Emergency Department:  [place]: [phone]  [facility]: [phone]               Rev. 10/2014

## 2018-09-14 NOTE — BRIEF OP NOTE
[facility], [place]    Brief Operative Note    Pre-operative diagnosis: Left Pelvic/Retroperitoneal Mass   Post-operative diagnosis Same   Procedure:   Left Pelvic Mass  - Wound Class: I-Clean  Surgeon:      * Andrey Barrow MD - Primary     * Rogelio Rivera MD - Assisting  Anesthesia: General   Estimated blood loss: 200 ml  Drains: None  Specimens:   ID Type Source Tests Collected by Time Destination   1 : hip Tissue Hip, Left ANAEROBIC BACTERIAL CULTURE, TISSUE CULTURE AEROBIC BACTERIAL Andrey Barrow MD 9/14/2018 10:13 AM    A : Left pelvic mass Tissue Hip, Left SURGICAL PATHOLOGY EXAM Andrey Barrow MD 9/14/2018 10:03 AM    B : Left Pelvic Mass Tissue Hip, Left SURGICAL PATHOLOGY EXAM Andrey Barrow MD 9/14/2018 10:12 AM      Findings:   Left pelvis mass    Foot warm and perfused prior to leaving the OR    Discharge home from PACU  Pain control  WBAT  Dressing off in 5-7 days    Follow-up with Dr. Barrow in 2 weeks    Rogelio Rivera MD  Orthopaedic Surgery, Adult Reconstruction Fellow  Pager [phone]

## 2018-09-14 NOTE — CONSULTS
"    [facility], Orthopaedic Oncology Surgery Consultation  by Andrey Barrow M.D.    Lilia Correia MRN# [id number]   Age: 38 year old YOB: 1980     Requesting physician: Wisam Smiley MD [facility]  Anika Simmons, [facility] Rheum  Mukund Matt, [facility] Nephrology              Assessment and Plan:   Assessment:  1. L sided intra-pelvic retroperitoneal mass w/ calcifications.  Unsuccessful CT guided biopsy.  Rule out neoplasm.  Diff Dx : lymphoma, schwannoma, synovial sarcoma, sarcoidosis, or other soft tissue neoplasm.  2. SLE w/ nephritis  3. Long term prednisone usage     Plan:  Decision made today to proceed with open biopsy.    I discussed the risks, benefits, alternatives regarding the proposed surgery with the patient and family who both demonstrated understanding and indicated a desire to proceed with the surgery as planned.             History of Present Illness:   38 year old female  chief complaint    Patient is seen today for discussion about biopsy procedure.  Pt did not come to clinic for evaluation earlier due to travel issues as she lives outste.    Current symptoms:  Problem: L sided mass, deep intra pelvic  Onset and duration: discovered incidentally upon CT done for GI workup.  Presently asymptomatic  Awakens from sleep due to sx's:  No  Precipitating Injury:  No    Other joints or sites painful:  No  Fever: No  Appetite change or weight loss: No    She had a CT guided bx which did not yield sufficient tissue for diagnosis.  Markers for lymphoma, upon discussion with Dr. Wisam Smiley, were not diagnostic either.  Tissue biopsy has been requested by Dr. Smiley           Physical Exam:     EXAMINATION pertinent findings:   VITAL SIGNS: Blood pressure 129/90, pulse 83, temperature 97  F (36.1  C), temperature source Oral, resp. rate 18, height 1.83 m (6' 0.05\"), weight 79.8 kg (175 lb 14.8 oz), last menstrual period 08/02/2018, SpO2 97 %.  Body mass index is " 23.83 kg/(m^2).  RESP: non labored breathing   ABD: benign   SKIN: grossly normal   LYMPHATIC: grossly normal   NEURO: grossly normal   VASCULAR: satisfactory perfusion of all extremities   MUSCULOSKELETAL:   Normal gait.  Hip painfree ROM           Data:   All laboratory data reviewed  All imaging studies reviewed by me        Attending MD (Dr. Andrey Barrow) Attestation :  This patient was seen and evaluated by me including a history, exam, and interpretation of all imaging and/or lab data.  Either a training physician (resident/fellow), who also saw the patient, or scribe has documented the clinic visit in the attached note.    Andrey Barrow MD  [facility]  Oncology and Adult Reconstructive Surgery  Dept Orthopaedic Surgery, [facility]  [phone] office, [phone] pager  [email or web address]        DATA for DOCUMENTATION:         Past Medical History:     Patient Active Problem List   Diagnosis     Retroperitoneal mass     Other forms of systemic lupus erythematosus (H)     Lupus nephritis, ISN/RPS class II (H)     Lupus anticoagulant positive     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     GERD (gastroesophageal reflux disease)      Hypertension      Left leg DVT (H) 2014     Lupus      Non-ischemic cardiomyopathy (H)     likely viral (EBV vs CMV) now resolved     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       Past Surgical History:     Past Surgical History:   Procedure Laterality Date     BIOPSY NEEDLE PELVIC MASS       ENDOSCOPY  07/24/2018      BIOPSY RENAL  2006            Social History:     Social History     Social History     Marital status: Single     Spouse name: N/A     Number of children: N/A     Years of education: N/A     Occupational  History     Not on file.     Social History Main Topics     Smoking status: Current Every Day Smoker     Packs/day: 0.50     Years: 20.00     Types: Cigarettes     Start date: 1/1/1998     Smokeless tobacco: Never Used     Alcohol use No     Drug use: No     Sexual activity: No     Other Topics Concern     Not on file     Social History Narrative            Family History:       Family History   Problem Relation Age of Onset     Depression Mother      Hypertension Father      Ovarian Cancer Maternal Grandmother 75     HEART DISEASE Maternal Grandfather 54     Dementia Paternal Grandmother 80     Other - See Comments Paternal Grandfather 92     old age            Medications:     Current Facility-Administered Medications   Medication     hemostatic matrix (SURGIFLO) kit     lactated ringers infusion     lidocaine (LMX4) cream     lidocaine 1 % 1 mL     PRE OP antibiotics NOT needed for this surgical procedure.     sodium chloride (PF) 0.9% PF flush 3 mL     sodium chloride (PF) 0.9% PF flush 3 mL     thrombin 5000 units vial     Facility-Administered Medications Ordered in Other Encounters   Medication     albumin human 5 % injection     clindamycin (CLEOCIN) infusion     ePHEDrine injection     fentaNYL (PF) (SUBLIMAZE) injection     hydrocortisone sodium succinate PF (solu-CORTEF) injection     HYDROmorphone (DILAUDID) injection     lactated ringers infusion     lidocaine injection 2% (MDV)     midazolam (VERSED) injection     phenylephrine (EDDIE-SYNEPHRINE) injection 1 mg     propofol (DIPRIVAN) injection 10 mg/mL vial     rocuronium (ZEMURON) injection              Review of Systems:   A comprehensive 10 point review of systems (constitutional, ENT, cardiac, peripheral vascular, lymphatic, respiratory, GI, , Musculoskeletal, skin, Neurological) was performed and found to be negative except as described in this note.

## 2018-09-14 NOTE — ANESTHESIA POSTPROCEDURE EVALUATION
Patient: Lilia Correia    Procedure(s):  Left Pelvic Mass  - Wound Class: I-Clean    Diagnosis:Left Pelvic/Retroperitoneal Mass   Diagnosis Additional Information: No value filed.    Anesthesia Type:  General    Note:  Anesthesia Post Evaluation    Patient location during evaluation: PACU  Patient participation: Able to fully participate in evaluation  Level of consciousness: awake  Pain management: adequate  Airway patency: patent  Cardiovascular status: acceptable  Respiratory status: acceptable  Hydration status: acceptable  PONV: none             Last vitals:  Vitals:    09/14/18 1130 09/14/18 1145 09/14/18 1200   BP: 112/71 120/73 117/72   Pulse:      Resp: 15 30 16   Temp:  36.5  C (97.7  F)    SpO2: 95% 97% 100%         Electronically Signed By: Bhavik Chavez DO  September 14, 2018  12:43 PM

## 2018-09-15 NOTE — OP NOTE
Procedure Date: 09/14/2018      PREOPERATIVE DIAGNOSIS:  Left intrapelvic retroperitoneal mass.        SECONDARY DIAGNOSIS: Systemic lupus erythematosus.      POSTOPERATIVE DIAGNOSIS:  Left intrapelvic retroperitoneal mass.      PROCEDURES PERFORMED:     1.  Open biopsy of left intrapelvic retroperitoneal mass.   2.  Intraoperative CT scan with separate workstation 3D reconstructions.      SURGEON: Andrey Barrow MD      ASSISTANT: Rogelio Rivera MD      INDICATIONS:  This patient has had lupus nephritis and has had long-term prednisone usage, and was found incidentally to have a mass several inches in diameter deep within her intrapelvic region in the retroperitoneal location.  An attempted CT-guided biopsy was not diagnostic.  Further markers for lymphoma were unrevealing.  It was therefore elected to proceed with a tissue biopsy.      DESCRIPTION OF PROCEDURE:  The patient was placed on the operating table in supine position after induction of general anesthesia.  The left hip was then turned up and the left lower extremity and left lower quadrant were then prepped and draped in sterile manner.  I then proceeded with making an incision along the left iliac crest from the anterior superior iliac spine posteriorly for a distance of several inches.  This was taken down through the subcutaneous tissues and the abdominal wall musculature was then taken down from the iliac bone.  Subperiosteal dissection along the iliac bone was then undertaken.  Once this was completed, the left hip joint was then flexed to relax the psoas muscle.  I then identified the tissues on the retroperitoneal area.  Unfortunately, I could not distinguish the tumor by palpation.  Using the CT scan and MR for targeting, I then elected to place a spinal needle in the area that I felt represented the tumor.      An intraoperative CT scan was now performed to determine the proper location and navigate to the tumor itself since visualization and  palpation were unsuccessful in identifying the tumor.      The O-arm CT scanner was brought into the operating room and used to encircle the patient.  Fluoroscopic targeting images were obtained to center the O-arm properly.  A CT scan was now performed with the 22-gauge spinal needle within the presumed mass.  I then interpreted the scan on a separate 3D workstation reconstructing the images.  The spinal needle was found to be directly within the mass, pointing towards the calcifications within the mass itself.  Based upon these findings, I then elected to proceed with a sampling of the tumor.      Using the needle as targeting, I then used a coring device to obtain 6 different cores of tissue from the mass itself.  Excellent tissue was obtained. I froze one core with the frozen section and viewed it with Dr. Spring Vallejo.  Diagnostic tissue of excellent quality was present.  Based upon these findings, the remaining cores were sent to pathology in saline for usage in flow cytometry and other molecular testing.  Hemostasis was secured as there were several bleeding points.  Bridging veins were either cauterized or ligated with Hemoclips. Surgiflo and Gelfoam were used to tamponade and control any hemostasis.  The wound was then closed in layers with absorbable sutures.  Sterile dressing was applied.         CHECO THOMAS MD             D: 2018   T: 09/15/2018   MT:       Name:     MAREN LEAHY   MRN:      [id number]        Account:        [id number]   :      1980           Procedure Date: 2018      Document: [id number]

## 2018-09-17 ENCOUNTER — TELEPHONE (OUTPATIENT)
Dept: ORTHOPEDICS | Facility: CLINIC | Age: 38
End: 2018-09-17

## 2018-09-17 NOTE — TELEPHONE ENCOUNTER
Vee had an open biopsy of a left retroperitoneal mass in the OR on Friday, 9/14/18.  She was concerned that she is experiencing numbness in her anterior thigh.  I assured that the numbness was most likely due to the  Nerves being stretched during the procedure and will slowly resolve.  She requested that she be able to follow up with Dr. Anika Overton, her primary care physician, for the post-op wound check.  I told her I would fax her notes and operative report to Dr. Simmons.

## 2018-09-19 LAB
BACTERIA SPEC CULT: NO GROWTH
SPECIMEN SOURCE: NORMAL

## 2018-09-21 LAB
BACTERIA SPEC CULT: NORMAL
Lab: NORMAL
SPECIMEN SOURCE: NORMAL

## 2018-09-24 ENCOUNTER — TELEPHONE (OUTPATIENT)
Dept: ORTHOPEDICS | Facility: CLINIC | Age: 38
End: 2018-09-24

## 2018-09-24 NOTE — TELEPHONE ENCOUNTER
----- Message -----     From: Andrey Barrow MD     Sent: 9/24/2018   2:22 PM       To: Wisam Smiley MD    I saw the path report and it was sent out to [facility] for review. I looked at the frozen and core specimens x 5 were obtained and judged to be quite sufficient for dx.   If a definitive dx is still not rendered upon review, next step probably is to excise the mass unless you're confident it can just be observed.  If needed, I'd ask our surgical oncology team to do so.  Just let me know.    Thanks, Ed

## 2018-09-27 ENCOUNTER — TRANSFERRED RECORDS (OUTPATIENT)
Dept: HEALTH INFORMATION MANAGEMENT | Facility: CLINIC | Age: 38
End: 2018-09-27

## 2018-09-28 ENCOUNTER — TELEPHONE (OUTPATIENT)
Dept: ORTHOPEDICS | Facility: CLINIC | Age: 38
End: 2018-09-28

## 2018-09-28 NOTE — TELEPHONE ENCOUNTER
Lilia had called about her biopsy results.  I told her that the specimen was sent to the [facility] for further testing and that Dr. Barrow and Dr. Smiley will call with a plan once they have final pathology results.

## 2018-09-29 ENCOUNTER — TELEPHONE (OUTPATIENT)
Dept: ORTHOPEDICS | Facility: CLINIC | Age: 38
End: 2018-09-29

## 2018-09-29 NOTE — TELEPHONE ENCOUNTER
----- Message from Aline Molina MD sent at 9/27/2018 11:41 AM CDT -----  All - slides went out to [facility] on Tuesday of this week. I'll update you as soon as I receive report back from them. Apologies for not following up by page/email after signing out the report last Friday - I meant to and it slipped through the cracks. - Aline       ----- Message -----     From: Wisam Smiley MD     Sent: 9/25/2018   8:12 AM       To: Andrey Barrow MD, Wisam Smiley MD, #    Ed, thank you for calling my attention to the report. There are a few abnormalities identified at her visit with us that may need pursuing, including the pulmonary lesion on CT and what appears to be a worsening anemia.     At this time I favor awaiting the [facility] path review and then deciding on an agreed upon approach. The original CT from outside showing the mass was obtained on 07/11. We are close to 3 months from that date and one approach might be to repeat imaging in early-mid October and see if there is any change before making any further plans.     Decision can await outside path review. Let's communicate again after the review is finalized.    Wisam        ----- Message -----     From: Andrey Barrow MD     Sent: 9/24/2018   2:22 PM       To: Wisam Smiley MD    I saw the path report and it was sent out to [facility] for review. I looked at the frozen and core specimens x 5 were obtained and judged to be quite sufficient for dx.   If a definitive dx is still not rendered upon review, next step probably is to excise the mass unless you're confident it can just be observed.  If needed, I'd ask our surgical oncology team to do so.  Just let me know.    Thanks, Paul

## 2018-10-05 LAB
COPATH REPORT: NORMAL
COPATH REPORT: NORMAL

## 2018-10-08 LAB — COPATH REPORT: NORMAL

## 2018-10-08 NOTE — PROGRESS NOTES
Vee, I reviewed your pathology report.  As you will see, it was also sent out to the [facility] for consultation.  Given the findings, I believe it probably would be most prudent to have the mass excised.  I will discuss this with Dr. Wisam Smiley as well.     Andrey Barrow MD  10/8/2018  8:59 AM

## 2018-10-11 DIAGNOSIS — R19.00 RETROPERITONEAL MASS: Primary | ICD-10-CM

## 2018-10-11 NOTE — TELEPHONE ENCOUNTER
Date of appointment: 10/26/2018   Diagnosis/reason for appointment: Retroperitoneal mass  Referring provider/facility: Dr. Barrow  Who called:    Recent Studies  Imaging: IN [facility]  Pathology: IN [facility]  Labs: IN [facility]  Previous chemo/radiation (if known):    Records requested from:  Records received from:    Additional information:

## 2018-10-17 ENCOUNTER — TRANSFERRED RECORDS (OUTPATIENT)
Dept: HEALTH INFORMATION MANAGEMENT | Facility: CLINIC | Age: 38
End: 2018-10-17

## 2018-10-24 ASSESSMENT — ENCOUNTER SYMPTOMS
SKIN CHANGES: 0
POOR WOUND HEALING: 0
NAIL CHANGES: 0

## 2018-10-26 ENCOUNTER — ONCOLOGY VISIT (OUTPATIENT)
Dept: ONCOLOGY | Facility: CLINIC | Age: 38
End: 2018-10-26
Attending: SURGERY
Payer: COMMERCIAL

## 2018-10-26 ENCOUNTER — PRE VISIT (OUTPATIENT)
Dept: ONCOLOGY | Facility: CLINIC | Age: 38
End: 2018-10-26

## 2018-10-26 ENCOUNTER — APPOINTMENT (OUTPATIENT)
Dept: LAB | Facility: CLINIC | Age: 38
End: 2018-10-26
Payer: COMMERCIAL

## 2018-10-26 VITALS
TEMPERATURE: 97 F | OXYGEN SATURATION: 98 % | HEART RATE: 72 BPM | BODY MASS INDEX: 23.86 KG/M2 | WEIGHT: 176.13 LBS | SYSTOLIC BLOOD PRESSURE: 118 MMHG | RESPIRATION RATE: 16 BRPM | DIASTOLIC BLOOD PRESSURE: 73 MMHG | HEIGHT: 72 IN

## 2018-10-26 DIAGNOSIS — M79.89 SOFT TISSUE MASS: Primary | ICD-10-CM

## 2018-10-26 PROCEDURE — G0463 HOSPITAL OUTPT CLINIC VISIT: HCPCS | Mod: ZF

## 2018-10-26 RX ORDER — LEVONORGESTREL AND ETHINYL ESTRADIOL 0.15-0.03
KIT ORAL
COMMUNITY
Start: 2018-10-24

## 2018-10-26 ASSESSMENT — PAIN SCALES - GENERAL: PAINLEVEL: NO PAIN (0)

## 2018-10-26 NOTE — PROGRESS NOTES
HISTORY OF PRESENT ILLNESS:  Lilia Correia is a 38-year-old woman I was asked to see at the request of Dr. Paul Barrow for evaluation of a left pelvic mass.   The patient has a history of lupus and underwent a CT scan in July which demonstrated an incidental left pelvic mass.  This was partially calcified.  It measured approximately 6 cm.  She underwent a CT-guided biopsy on 08/01/2018, which demonstrated atypical lymphoid proliferation.  This was felt to be nondiagnostic.  She saw Dr. Barrow and he performed an open surgical biopsy on 09/14.  He made an incision in the left lower abdomen and could not feel the mass, so he then obtained an intraoperative CT-guided biopsy, which was also reviewed at the [facility] and was also consistent with atypical lymphoid hyperplasia.  Dr. Smiley and Dr. Barrow asked me to see her to talk about whether or not this should be removed.  She had a followup CT scan earlier this month which demonstrated the mass was unchanged.  The mass is about 6 cm in size, it is partially calcified.  It is very close to the internal iliac artery and vein.  In questioning the patient, she has no symptoms regarding this mass.  We do not have any previous CT scans for evaluation.      PHYSICAL EXAMINATION:  She has a healed incision in the left lower quadrant.  There is no palpable mass present.      IMPRESSION:  Atypical lymphoid hyperplasia.        PLAN:  I talked to her about the diagnosis and we had no evidence of malignancy.  A told her that it is unlikely to be malignant based on the 2 needle biopsies.  I told her that the only way to know for certainly would be to completely remove the mass.  However, removal of this mass would be a substantial undertaking because of its location.  If we took this out, I would perform a midline laparotomy, reflect her sigmoid colon and her descending colon to the right, spare her ureter and then carefully dissect this mass off her internal iliac vessels.  Although this  is a safe operation, it is still a big operation.  So, I left her with the option of either observation with MRI every 6 months for a couple of years, or to undergo surgical resection.  She and her family preferred observation with MRI.  I think this is reasonable.  I told her that this mass may have been present and unchanged for many years.  She is going to have her MRIs in [place] and they will forward her reports to me.      TT:  30 minutes.  CT:  25 minutes.       cc:   Andrey Barrow MD    Physicians    [address]   [place]  [address]      Wisam Smiley MD    Physicians    [address], [address]   [place]  [address]

## 2018-10-26 NOTE — NURSING NOTE
"Oncology Rooming Note    October 26, 2018 11:18 AM   Lilia Correia is a 38 year old female who presents for:    Chief Complaint   Patient presents with     Oncology Clinic Visit     New Pt. Retroperitoneal Mass     Initial Vitals: /73  Pulse 72  Temp 97  F (36.1  C) (Oral)  Resp 16  Ht 1.854 m (6' 1\")  Wt 79.9 kg (176 lb 2 oz)  LMP 08/01/2018 (Approximate)  SpO2 98%  Breastfeeding? No  BMI 23.24 kg/m2 Estimated body mass index is 23.24 kg/(m^2) as calculated from the following:    Height as of this encounter: 1.854 m (6' 1\").    Weight as of this encounter: 79.9 kg (176 lb 2 oz). Body surface area is 2.03 meters squared.  No Pain (0) Comment: Data Unavailable   Patient's last menstrual period was 08/01/2018 (approximate).  Allergies reviewed: Yes  Medications reviewed: Yes    Medications: Medication refills not needed today.  Pharmacy name entered into [facility]: [facility] - [place], [place] - [address]    Clinical concerns: new patient here for a consult for Retroperitoneal mass Dr. dominique was notified.    10 minutes for nursing intake (face to face time)     Vipul Guerra CMA              "

## 2018-10-26 NOTE — MR AVS SNAPSHOT
"              After Visit Summary   10/26/2018    Lilia Correia    MRN: [id number]           Patient Information     Date Of Birth          1980        Visit Information        Provider Department      10/26/2018 11:15 AM Isai Goomdan MD [facility]        Today's Diagnoses     Soft tissue mass    -  1       Follow-ups after your visit        Who to contact     If you have questions or need follow up information about today's clinic visit or your schedule please contact [facility] directly at [phone].  Normal or non-critical lab and imaging results will be communicated to you by MyChart, letter or phone within 4 business days after the clinic has received the results. If you do not hear from us within 7 days, please contact the clinic through [employer] or phone. If you have a critical or abnormal lab result, we will notify you by phone as soon as possible.  Submit refill requests through [employer] or call your pharmacy and they will forward the refill request to us. Please allow 3 business days for your refill to be completed.          Additional Information About Your Visit        MyChart Information     [employer] gives you secure access to your electronic health record. If you see a primary care provider, you can also send messages to your care team and make appointments. If you have questions, please call your primary care clinic.  If you do not have a primary care provider, please call [phone] and they will assist you.        Care EveryWhere ID     This is your Care EveryWhere ID. This could be used by other organizations to access your [place] medical records  [id number]        Your Vitals Were     Pulse Temperature Respirations Height Last Period Pulse Oximetry    72 97  F (36.1  C) (Oral) 16 1.854 m (6' 1\") 08/01/2018 (Approximate) 98%    Breastfeeding? BMI (Body Mass Index)                No 23.24 kg/m2           Blood Pressure from Last 3 Encounters:   10/26/18 118/73 "   09/14/18 104/59   08/22/18 96/68    Weight from Last 3 Encounters:   10/26/18 79.9 kg (176 lb 2 oz)   09/14/18 79.8 kg (175 lb 14.8 oz)   08/22/18 80.2 kg (176 lb 11.2 oz)              Today, you had the following     No orders found for display       Primary Care Provider Office Phone # Fax #    Anika Simmons [phone]       [facility] [address] [facility] [address]        Equal Access to Services     [facility] Center[facility]ENEE : Hadii aad ku hadasho Soomaali, waaxda luqadaha, qaybta kaalmada adeegyaalok, brandon guillermo . So [facility] [phone].    ATENCIÓN: Si habla español, tiene a medeiros disposición servicios gratuitos de asistencia lingüística. [facility] [phone].    We comply with applicable federal civil rights laws and [place] laws. We do not discriminate on the basis of race, color, national origin, age, disability, sex, sexual orientation, or gender identity.            Thank you!     Thank you for choosing [facility]  for your care. Our goal is always to provide you with excellent care. Hearing back from our patients is one way we can continue to improve our services. Please take a few minutes to complete the written survey that you may receive in the mail after your visit with us. Thank you!             Your Updated Medication List - Protect others around you: Learn how to safely use, store and throw away your medicines at www.disposemymeds.org.          This list is accurate as of 10/26/18 11:59 PM.  Always use your most recent med list.                   Brand Name Dispense Instructions for use Diagnosis    acetaminophen-codeine 300-30 MG per tablet    TYLENOL #3     Take 30 tablets by mouth as needed        Biotin 96097 MCG Tabs      Take 1 tablet by mouth 2 times daily        chloroquine 250 MG tablet    ARALEN     Take 250 mg by mouth daily        cholecalciferol 5000 units Caps capsule    vitamin D3     Take 5,000 Units by mouth daily         CRANBERRY (VACC OXYCOCCUS) PO      Take 1 capsule by mouth daily        folic acid 400 MCG tablet    FOLVITE     Take 400 mcg by mouth 2 times daily        HYDROcodone-acetaminophen 5-325 MG per tablet    NORCO    30 tablet    Take 1 tablet by mouth every 4 hours as needed for pain    Retroperitoneal mass       IRON (FERROUS SULFATE) PO      Take 1 tablet by mouth 2 times daily        levonorgestrel-ethinyl estradiol 0.15-0.03 MG per tablet    SEASONALE          lisinopril 5 MG tablet    PRINIVIL/ZESTRIL     2.5 mg 2 times daily        magnesium 250 MG tablet           MARLISSA 0.15-30 MG-MCG per tablet   Generic drug:  levonorgestrel-ethinyl estradiol           methotrexate (Anti-Rheumatic) 2.5 MG Tabs           PREDNISONE PO      Take 20 mg by mouth        XARELTO 20 MG Tabs tablet   Generic drug:  rivaroxaban ANTICOAGULANT

## 2020-03-11 ENCOUNTER — HEALTH MAINTENANCE LETTER (OUTPATIENT)
Age: 40
End: 2020-03-11

## 2021-01-03 ENCOUNTER — HEALTH MAINTENANCE LETTER (OUTPATIENT)
Age: 41
End: 2021-01-03

## 2021-04-25 ENCOUNTER — HEALTH MAINTENANCE LETTER (OUTPATIENT)
Age: 41
End: 2021-04-25

## 2021-10-10 ENCOUNTER — HEALTH MAINTENANCE LETTER (OUTPATIENT)
Age: 41
End: 2021-10-10

## 2022-05-21 ENCOUNTER — HEALTH MAINTENANCE LETTER (OUTPATIENT)
Age: 42
End: 2022-05-21

## 2022-09-18 ENCOUNTER — HEALTH MAINTENANCE LETTER (OUTPATIENT)
Age: 42
End: 2022-09-18

## 2023-06-04 ENCOUNTER — HEALTH MAINTENANCE LETTER (OUTPATIENT)
Age: 43
End: 2023-06-04

## 2024-02-25 ENCOUNTER — HEALTH MAINTENANCE LETTER (OUTPATIENT)
Age: 44
End: 2024-02-25

## (undated) DEVICE — DRAPE U SPLIT 74X120" 29440

## (undated) DEVICE — SPECIMEN CONTAINER 5OZ STERILE 2600SA

## (undated) DEVICE — DRAPE O ARM TUBE 9732722

## (undated) DEVICE — 24 MEDIUM TITANIUM LIGATING CLIPS

## (undated) DEVICE — ESU PENCIL W/HOLSTER E2350H

## (undated) DEVICE — SYR 10ML FINGER CONTROL W/O NDL 309695

## (undated) DEVICE — NDL SPINAL 22GA 3.5" QUINCKE 405181

## (undated) DEVICE — DRAPE STERI TOWEL LG 1010

## (undated) DEVICE — NDL SPINAL 20GA 3.5" 405182

## (undated) DEVICE — WRAP MCCONNELL ARM SUPPORT LG 12-401

## (undated) DEVICE — SOL NACL 0.9% IRRIG 1000ML BOTTLE 2F7124

## (undated) DEVICE — LINEN TOWEL PACK X5 5464

## (undated) DEVICE — PREP DURAPREP 26ML APL 8630

## (undated) DEVICE — LINEN GOWN X4 5410

## (undated) DEVICE — SU VICRYL 2-0 CT 36" J957H

## (undated) DEVICE — DRSG STERI STRIP 1/2X4" R1547

## (undated) DEVICE — PREP POVIDONE IODINE SCRUB 7.5% 120ML

## (undated) DEVICE — GLOVE PROTEXIS W/NEU-THERA 8.0  2D73TE80

## (undated) DEVICE — LINEN BACK PACK 5440

## (undated) DEVICE — LINEN GOWN OVERSIZE 5408

## (undated) DEVICE — SU PDS II 3-0 PS-1 18" Z683G

## (undated) DEVICE — Device

## (undated) DEVICE — CLIP HORIZON MED BLUE 002200

## (undated) DEVICE — ESU GROUND PAD UNIVERSAL W/O CORD

## (undated) DEVICE — DRAPE STOCKINETTE 8" 8586

## (undated) DEVICE — SU VICRYL 0 CT-1 3X27" J430T

## (undated) DEVICE — SPONGE SURGIFOAM 100 1974

## (undated) DEVICE — DRAPE TIBURON TOP SHEET 100X60" 29352

## (undated) DEVICE — SU ETHIBOND 1 CT-1 30" X425H

## (undated) DEVICE — NDL BX TRU CUT 14GA 6" 2N2704X

## (undated) DEVICE — APPLICATOR COTTON TIP 6"X2 STERILE LF 6012

## (undated) DEVICE — DRAPE STOCKINETTE IMPERVIOUS 12" 1587

## (undated) DEVICE — STRAP KNEE/BODY 31143004

## (undated) DEVICE — GLOVE PROTEXIS BLUE W/NEU-THERA 7.5  2D73EB75

## (undated) DEVICE — SU SILK 2-0 SH 30" K833H

## (undated) DEVICE — PACK TOTAL HIP W/U DRAPE RIVERSIDE LATEX FREE

## (undated) DEVICE — GLOVE PROTEXIS BLUE W/NEU-THERA 8.5  2D73EB85

## (undated) DEVICE — COVER CAMERA IN-LIGHT DISP LT-C02

## (undated) DEVICE — PREP SKIN SCRUB TRAY 4461A

## (undated) DEVICE — VESSEL LOOPS RED MAXI

## (undated) DEVICE — SU SILK 3-0 TIE 12X30" A304H

## (undated) DEVICE — SU SILK 2-0 TIE 12X30" A305H

## (undated) DEVICE — BAG BIOHAZARD SPECIMEN 9X6" ORANGE/WHITE SBL2X69B

## (undated) DEVICE — GOWN IMPERVIOUS SPECIALTY XLG/XLONG 32474

## (undated) DEVICE — SUCTION MANIFOLD DORNOCH ULTRA CART UL-CL500

## (undated) DEVICE — BLADE KNIFE SURG 15 371115

## (undated) DEVICE — DRSG AQUACEL AG 3.5X6.0" HYDROFIBER 412010

## (undated) DEVICE — DRAPE IOBAN INCISE 23X17" 6650EZ

## (undated) DEVICE — GLOVE PROTEXIS W/NEU-THERA 7.0  2D73TE70

## (undated) DEVICE — BONE WAX 2.5GM W31G

## (undated) DEVICE — SURGIFLO

## (undated) DEVICE — DRSG TELFA 3X8" 1238

## (undated) DEVICE — SOL WATER IRRIG 1000ML BOTTLE 2F7114

## (undated) DEVICE — TUBING SUCTION MEDI-VAC SOFT 3/16"X20' N520A

## (undated) DEVICE — DRAPE CONVERTORS U-DRAPE 60X72" 8476

## (undated) RX ORDER — HYDROMORPHONE HYDROCHLORIDE 1 MG/ML
INJECTION, SOLUTION INTRAMUSCULAR; INTRAVENOUS; SUBCUTANEOUS
Status: DISPENSED
Start: 2018-09-14

## (undated) RX ORDER — SODIUM CHLORIDE 9 MG/ML
INJECTION, SOLUTION INTRAVENOUS
Status: DISPENSED
Start: 2018-08-01

## (undated) RX ORDER — FENTANYL CITRATE 50 UG/ML
INJECTION, SOLUTION INTRAMUSCULAR; INTRAVENOUS
Status: DISPENSED
Start: 2018-08-01

## (undated) RX ORDER — ALBUTEROL SULFATE 0.83 MG/ML
SOLUTION RESPIRATORY (INHALATION)
Status: DISPENSED
Start: 2018-09-14

## (undated) RX ORDER — FENTANYL CITRATE 50 UG/ML
INJECTION, SOLUTION INTRAMUSCULAR; INTRAVENOUS
Status: DISPENSED
Start: 2018-09-14

## (undated) RX ORDER — GABAPENTIN 300 MG/1
CAPSULE ORAL
Status: DISPENSED
Start: 2018-09-14

## (undated) RX ORDER — LIDOCAINE HYDROCHLORIDE 10 MG/ML
INJECTION, SOLUTION EPIDURAL; INFILTRATION; INTRACAUDAL; PERINEURAL
Status: DISPENSED
Start: 2018-08-01

## (undated) RX ORDER — HYDROCODONE BITARTRATE AND ACETAMINOPHEN 5; 325 MG/1; MG/1
TABLET ORAL
Status: DISPENSED
Start: 2018-09-14

## (undated) RX ORDER — ACETAMINOPHEN 325 MG/1
TABLET ORAL
Status: DISPENSED
Start: 2018-09-14